# Patient Record
Sex: MALE | Race: WHITE | Employment: FULL TIME | URBAN - METROPOLITAN AREA
[De-identification: names, ages, dates, MRNs, and addresses within clinical notes are randomized per-mention and may not be internally consistent; named-entity substitution may affect disease eponyms.]

---

## 2020-03-31 ENCOUNTER — TELEPHONE (OUTPATIENT)
Dept: BEHAVIORAL HEALTH | Facility: CLINIC | Age: 40
End: 2020-03-31

## 2020-03-31 ENCOUNTER — HOSPITAL ENCOUNTER (EMERGENCY)
Facility: CLINIC | Age: 40
Discharge: ANOTHER HOSPITAL | End: 2020-03-31
Attending: EMERGENCY MEDICINE | Admitting: EMERGENCY MEDICINE

## 2020-03-31 VITALS
WEIGHT: 173.5 LBS | HEART RATE: 68 BPM | OXYGEN SATURATION: 99 % | HEIGHT: 70 IN | TEMPERATURE: 98.2 F | RESPIRATION RATE: 16 BRPM | DIASTOLIC BLOOD PRESSURE: 74 MMHG | SYSTOLIC BLOOD PRESSURE: 101 MMHG | BODY MASS INDEX: 24.84 KG/M2

## 2020-03-31 DIAGNOSIS — R45.851 SUICIDAL IDEATION: ICD-10-CM

## 2020-03-31 PROBLEM — Z86.59 HX OF MAJOR DEPRESSION: Status: ACTIVE | Noted: 2017-08-21

## 2020-03-31 LAB
AMPHETAMINES UR QL SCN: NEGATIVE
ANION GAP SERPL CALCULATED.3IONS-SCNC: 4 MMOL/L (ref 3–14)
BARBITURATES UR QL: NEGATIVE
BASOPHILS # BLD AUTO: 0.1 10E9/L (ref 0–0.2)
BASOPHILS NFR BLD AUTO: 0.7 %
BENZODIAZ UR QL: NEGATIVE
BUN SERPL-MCNC: 16 MG/DL (ref 7–30)
CALCIUM SERPL-MCNC: 9.2 MG/DL (ref 8.5–10.1)
CANNABINOIDS UR QL SCN: POSITIVE
CHLORIDE SERPL-SCNC: 108 MMOL/L (ref 94–109)
CO2 SERPL-SCNC: 27 MMOL/L (ref 20–32)
COCAINE UR QL: NEGATIVE
CREAT SERPL-MCNC: 0.85 MG/DL (ref 0.66–1.25)
DIFFERENTIAL METHOD BLD: NORMAL
EOSINOPHIL # BLD AUTO: 0.2 10E9/L (ref 0–0.7)
EOSINOPHIL NFR BLD AUTO: 3.2 %
ERYTHROCYTE [DISTWIDTH] IN BLOOD BY AUTOMATED COUNT: 13 % (ref 10–15)
ETHANOL SERPL-MCNC: <0.01 G/DL
ETHANOL UR QL SCN: NEGATIVE
GFR SERPL CREATININE-BSD FRML MDRD: >90 ML/MIN/{1.73_M2}
GLUCOSE SERPL-MCNC: 84 MG/DL (ref 70–99)
HCT VFR BLD AUTO: 47.9 % (ref 40–53)
HGB BLD-MCNC: 15.6 G/DL (ref 13.3–17.7)
IMM GRANULOCYTES # BLD: 0 10E9/L (ref 0–0.4)
IMM GRANULOCYTES NFR BLD: 0.3 %
LYMPHOCYTES # BLD AUTO: 2 10E9/L (ref 0.8–5.3)
LYMPHOCYTES NFR BLD AUTO: 29.1 %
MCH RBC QN AUTO: 30.6 PG (ref 26.5–33)
MCHC RBC AUTO-ENTMCNC: 32.6 G/DL (ref 31.5–36.5)
MCV RBC AUTO: 94 FL (ref 78–100)
MONOCYTES # BLD AUTO: 0.7 10E9/L (ref 0–1.3)
MONOCYTES NFR BLD AUTO: 9.5 %
NEUTROPHILS # BLD AUTO: 3.9 10E9/L (ref 1.6–8.3)
NEUTROPHILS NFR BLD AUTO: 57.2 %
NRBC # BLD AUTO: 0 10*3/UL
NRBC BLD AUTO-RTO: 0 /100
OPIATES UR QL SCN: NEGATIVE
PLATELET # BLD AUTO: 252 10E9/L (ref 150–450)
POTASSIUM SERPL-SCNC: 3.8 MMOL/L (ref 3.4–5.3)
RBC # BLD AUTO: 5.1 10E12/L (ref 4.4–5.9)
SODIUM SERPL-SCNC: 139 MMOL/L (ref 133–144)
WBC # BLD AUTO: 6.9 10E9/L (ref 4–11)

## 2020-03-31 PROCEDURE — 80048 BASIC METABOLIC PNL TOTAL CA: CPT | Performed by: EMERGENCY MEDICINE

## 2020-03-31 PROCEDURE — 90791 PSYCH DIAGNOSTIC EVALUATION: CPT

## 2020-03-31 PROCEDURE — 99285 EMERGENCY DEPT VISIT HI MDM: CPT | Mod: Z6 | Performed by: EMERGENCY MEDICINE

## 2020-03-31 PROCEDURE — 80320 DRUG SCREEN QUANTALCOHOLS: CPT | Performed by: EMERGENCY MEDICINE

## 2020-03-31 PROCEDURE — 99285 EMERGENCY DEPT VISIT HI MDM: CPT | Mod: 25 | Performed by: EMERGENCY MEDICINE

## 2020-03-31 PROCEDURE — 85025 COMPLETE CBC W/AUTO DIFF WBC: CPT | Performed by: EMERGENCY MEDICINE

## 2020-03-31 PROCEDURE — 80307 DRUG TEST PRSMV CHEM ANLYZR: CPT | Performed by: EMERGENCY MEDICINE

## 2020-03-31 RX ORDER — DEXTROAMPHETAMINE SACCHARATE, AMPHETAMINE ASPARTATE, DEXTROAMPHETAMINE SULFATE AND AMPHETAMINE SULFATE 1.25; 1.25; 1.25; 1.25 MG/1; MG/1; MG/1; MG/1
5 TABLET ORAL 2 TIMES DAILY
Status: ON HOLD | COMMUNITY
End: 2020-04-01

## 2020-03-31 RX ORDER — SERTRALINE HYDROCHLORIDE 100 MG/1
150 TABLET, FILM COATED ORAL DAILY
Status: ON HOLD | COMMUNITY
End: 2020-04-09

## 2020-03-31 RX ORDER — CLONAZEPAM 0.5 MG/1
0.5 TABLET ORAL 2 TIMES DAILY PRN
Status: ON HOLD | COMMUNITY
End: 2020-04-09

## 2020-03-31 ASSESSMENT — ENCOUNTER SYMPTOMS
NERVOUS/ANXIOUS: 1
EYE REDNESS: 0
ABDOMINAL PAIN: 0
COLOR CHANGE: 0
DYSPHORIC MOOD: 1
NECK STIFFNESS: 0
CONFUSION: 0
SHORTNESS OF BREATH: 0
DIFFICULTY URINATING: 0
ARTHRALGIAS: 0
FEVER: 0
HEADACHES: 0
CHILLS: 0
COUGH: 0

## 2020-03-31 ASSESSMENT — MIFFLIN-ST. JEOR: SCORE: 1713.24

## 2020-03-31 NOTE — ED NOTES
I have performed an in person assessment of the patient. Based on this assessment the patient no longer requires a one on one attendant at this point in time.    Nancy Cotton DO  4:27 PM  March 31, 2020         Nancy Cotton DO  03/31/20 [id number]

## 2020-03-31 NOTE — ED TRIAGE NOTES
"39 year old male presents to ED with concerns of possible self harm.  Patient states that he has been working about 70 hours a week, feels little support at home, and has two dependent children confined to the home due to pandemic.  Patient has thoughts of harming himself, but no concrete plan, and no means to act upon any plan.  Patient has been seeing a therapist, but over the past few days his \"thoughts have been racing\" and he is becoming scared.   "
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
## 2020-03-31 NOTE — TELEPHONE ENCOUNTER
Patient cleared and ready for behavioral bed placement: Yes    S Pt is a 39 year old male at the [facility] ed    B Pt is SI with a plan to jump off a bridge. Pt has been staying in a hotel since Sunday. Pt left house with girlfriend and kids after an argument. Pt has been suicidal since Sunday. Pt has anxiety. Pt did speak with his psychiatrist via [place] who directed pt to ed. Pt denies psychosis and HI. Pt has some delusional thinking such as paranoia around his girlfriend doing things are not true but he does realize this. Pt denies drug use other then marijuana today or alcohol last use a week ago. Pt denies legal issues. Pt is medically cleared.     ERIKA Sequeira/5S    - 534pm intake spoke with ed to see if pt is willing to go to [place]  -543PM pt is willing to go to [place]  -545 called provider left vm to call back  -unit and ed aware of admission ed to call asap for report

## 2020-04-01 ENCOUNTER — HOSPITAL ENCOUNTER (INPATIENT)
Facility: HOSPITAL | Age: 40
LOS: 9 days | Discharge: HOME | End: 2020-04-10
Attending: PSYCHIATRY & NEUROLOGY | Admitting: PSYCHIATRY & NEUROLOGY
Payer: COMMERCIAL

## 2020-04-01 DIAGNOSIS — E55.9 VITAMIN D DEFICIENCY: Primary | ICD-10-CM

## 2020-04-01 PROBLEM — R45.851 DEPRESSION WITH SUICIDAL IDEATION: Status: ACTIVE | Noted: 2020-04-01

## 2020-04-01 PROBLEM — F17.200 SMOKER: Status: ACTIVE | Noted: 2018-07-13

## 2020-04-01 PROBLEM — F32.A DEPRESSION WITH SUICIDAL IDEATION: Status: ACTIVE | Noted: 2020-04-01

## 2020-04-01 PROBLEM — L21.9 SEBORRHEIC DERMATITIS: Status: ACTIVE | Noted: 2018-07-24

## 2020-04-01 PROBLEM — F33.2 SEVERE EPISODE OF RECURRENT MAJOR DEPRESSIVE DISORDER, WITHOUT PSYCHOTIC FEATURES (H): Status: ACTIVE | Noted: 2017-08-21

## 2020-04-01 PROBLEM — Z82.49 FAMILY HISTORY OF HEART DISEASE IN MALE FAMILY MEMBER BEFORE AGE 55: Status: ACTIVE | Noted: 2017-08-21

## 2020-04-01 PROCEDURE — 99223 1ST HOSP IP/OBS HIGH 75: CPT | Performed by: NURSE PRACTITIONER

## 2020-04-01 PROCEDURE — 12400000 ZZH R&B MH

## 2020-04-01 RX ORDER — BENZOCAINE/MENTHOL 6 MG-10 MG
LOZENGE MUCOUS MEMBRANE 2 TIMES DAILY PRN
Status: DISCONTINUED | OUTPATIENT
Start: 2020-04-01 | End: 2020-04-10 | Stop reason: HOSPADM

## 2020-04-01 RX ORDER — VENLAFAXINE HYDROCHLORIDE 37.5 MG/1
37.5 CAPSULE, EXTENDED RELEASE ORAL
Status: COMPLETED | OUTPATIENT
Start: 2020-04-02 | End: 2020-04-03

## 2020-04-01 RX ORDER — ACETAMINOPHEN 325 MG/1
650 TABLET ORAL EVERY 4 HOURS PRN
Status: DISCONTINUED | OUTPATIENT
Start: 2020-04-01 | End: 2020-04-10 | Stop reason: HOSPADM

## 2020-04-01 RX ORDER — VENLAFAXINE HYDROCHLORIDE 75 MG/1
75 CAPSULE, EXTENDED RELEASE ORAL
Status: COMPLETED | OUTPATIENT
Start: 2020-04-04 | End: 2020-04-05

## 2020-04-01 RX ORDER — HYDROXYZINE HYDROCHLORIDE 25 MG/1
25-50 TABLET, FILM COATED ORAL EVERY 4 HOURS PRN
Status: DISCONTINUED | OUTPATIENT
Start: 2020-04-01 | End: 2020-04-10 | Stop reason: HOSPADM

## 2020-04-01 RX ORDER — ALUMINA, MAGNESIA, AND SIMETHICONE 2400; 2400; 240 MG/30ML; MG/30ML; MG/30ML
30 SUSPENSION ORAL EVERY 4 HOURS PRN
Status: DISCONTINUED | OUTPATIENT
Start: 2020-04-01 | End: 2020-04-10 | Stop reason: HOSPADM

## 2020-04-01 RX ORDER — TRAZODONE HYDROCHLORIDE 50 MG/1
50 TABLET, FILM COATED ORAL
Status: DISCONTINUED | OUTPATIENT
Start: 2020-04-01 | End: 2020-04-05 | Stop reason: ALTCHOICE

## 2020-04-01 RX ORDER — VENLAFAXINE HYDROCHLORIDE 150 MG/1
150 CAPSULE, EXTENDED RELEASE ORAL
Status: DISCONTINUED | OUTPATIENT
Start: 2020-04-06 | End: 2020-04-05

## 2020-04-01 ASSESSMENT — ACTIVITIES OF DAILY LIVING (ADL)
DRESS: SCRUBS (BEHAVIORAL HEALTH);INDEPENDENT
HYGIENE/GROOMING: INDEPENDENT
SWALLOWING: 0-->SWALLOWS FOODS/LIQUIDS WITHOUT DIFFICULTY
ORAL_HYGIENE: INDEPENDENT
AMBULATION: 0-->INDEPENDENT
RETIRED_EATING: 0-->INDEPENDENT
BATHING: 0-->INDEPENDENT
PRIOR_FUNCTIONAL_LEVEL_COMMENT: .
TOILETING: 0-->INDEPENDENT
TRANSFERRING: 0-->INDEPENDENT
FALL_HISTORY_WITHIN_LAST_SIX_MONTHS: NO
DRESS: INDEPENDENT
HYGIENE/GROOMING: INDEPENDENT
ORAL_HYGIENE: INDEPENDENT
DRESS: 0-->INDEPENDENT
ORAL_HYGIENE: INDEPENDENT
HYGIENE/GROOMING: INDEPENDENT
DRESS: SCRUBS (BEHAVIORAL HEALTH)
COGNITION: 0 - NO COGNITION ISSUES REPORTED
RETIRED_COMMUNICATION: 0-->UNDERSTANDS/COMMUNICATES WITHOUT DIFFICULTY
LAUNDRY: UNABLE TO COMPLETE

## 2020-04-01 NOTE — PROGRESS NOTES
04/01/20 0238   Valuables   Patient Belongings locker;sent to security per site process   Patient Belongings Put in Hospital Secure Location (Security or Locker, etc.) cash/credit card;cell phone/electronics;clothing;keys;plastic bag;purse/wallet;shoes;wallet  (6 camel cigs, white lighter chapstick, misc. rewards cards, hotel card, and papers, library card, brn wallet, brn boots, phone #'s, beige pants, blue boxers, wht tshirt, copper colored vest, black socks, anna/grn flannel shirt)   Did you bring any home meds/supplements to the hospital?  No   List items sent to safe: State of [place] check in the amount of $112.31, set of keys, white apple cell phone, black phone case, 2 white us debit bank cards, 1 blue us bank card, [employer] visa card, [employer] one mastercard, [employer] gift, [place] [employer] employee badge    All other belongings put in assigned cubby in belongings room.     I have reviewed my belongings list on admission and verify that it is correct.     Patient signature_______________________________    Second staff witness (if patient unable to sign) ______________________________       I have received all my belongings at discharge.    Patient signature________________________________    BASILIA Clarke  4/1/2020  2:53 AM
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
## 2020-04-01 NOTE — PLAN OF CARE
"  Problem: Depression  Goal: Improved Mood  Outcome: No Change     Problem: Adult Behavioral Health Plan of Care  Goal: Patient-Specific Goal (Individualization)  Outcome: No Change  Note:   ADMISSION NOTE    Reason for admission Depression and suicidal thoughts. My Psychiatrist asked me to go in to ER.  Safety concerns Suicidal thoughts.  Risk for or history of violence none.   Full skin assessment: done    Patient arrived on unit from [facility] ED accompanied by Ambulance and security on 4/1/2020  3:31 AM.   Status on arrival: via stretcher  BP 99/62   Pulse 77   Temp 96.8  F (36  C) (Tympanic)   Resp 16   SpO2 98%   Patient given tour of unit and Welcome to  unit papers given to patient, wanding completed, belongings inventoried, and admission assessment completed.   Patient's legal status on arrival is voluntary. Appropriate legal rights discussed with and copy given to patient. Patient Bill of Rights discussed with and copy given to patient.   Patient denies SI, HI, and thoughts of self harm and contracts for safety while on unit.      Patient admitted from the [facility] ED. Patient states he has had increasing depression with thoughts of suicide yesterday to jump off a bridge. Patient has been taking zoloft and in the past two days his psychiatrist also gave him klonopin 0.5 mg po bid and that helped with his anxiety. Patient states he has slept poorly related to his job, his children being home more now and the fears related to the corona virus. Patient states he feels now that things can get better and has been able to talk about his feelings. Does not feel that he has a good support system at home with his girlfriend. States he has been working 70 plus hours per week.  Denies physical problems including pain.   Hansa Barriga, RN  4/1/2020  0141 AM           Patient's Stated Goal for Shift:  \"no stated goal\"    Goal Status:  In Process       Problem: Suicide " Risk  Goal: Absence of Self-Harm  Outcome: Improving

## 2020-04-01 NOTE — H&P
"[facility]    Psychiatric Evaluation/History & Physical    Patient Name: Jason Ring   YOB: 1980  Age: 39 year old  [id number]    Primary Physician: No Ref-Primary, Physician   Completed By: RAFAEL Arceo CNP     CC: \"stressed\"         HPI:     Jason Ring is a 39 year old 39 year old male with a medical history significant for suicidal ideation and alcohol abuse who presents to the Emergency Department for suicidal ideation.  Stressors include increased workload at [employer] of 70 hours six days a wee, three weeks of worsening feelings of hopelessness and thoughts of suicide.  Lives with girlfriend of 10 years and caring for two small children at home.  He states he has only been sleeping about three hours a night.  He stayed in a hotel last night to try and \"escape\" his thoughts but continues to have thoughts of jumping off a bridge. Currently taking Zoloft and clonazepam.  Reports one previous hospitalization when drunk and suicidal in [address]014.  Toxicology was positive for cannabis. Patient was transferred to [facility] and voluntarily admitted to Inpatient Behavioral Health for further assessment and stabilization.    During initial interview with  present, patient stated he has struggled with depression since his 20's and tried fluoxetine and Zoloft, which he feels is not working.  Reviewed antidepressants and agreeable to start SNRI Effexor titrated up to 150 mg.  Educated regarding medication indications, risks, benefits, side effects, contraindications and possible interactions. Verbally expressed understanding.Had been seeing a therapist but did not care for the lack of direction \"nice mp, just listens\". Reviewed other therapies such as CBT which patient may find beneficial.  Stated lack of sleep, increased workload and lack of resources \"like a [place] rush but not staffed for this in March\" with [employer]. Had been sleeping in two periods of 3-4 hours " "each but now only sleeping about three hours. Stated increased anxiety. Was having thoughts of suicide with plan but denied today. Stated decreased appetite with 10 pound weight loss, clothes are reported to be looser.  Denied paranoid thoughts or auditory and visual hallucinations.  Stated this is \"not like the other hospitalization\" which he noted he was drunk and suicidal.  Stated he does use mariajuana once in a while but not finding the results therapeutic at this time.     Patient provides information for this assessment. Intake data, records from previous hospitalizations and records from the Emergency Department were reviewed.          PMSPFH:     Past Medical History:  Past Medical History:   Diagnosis Date     Closed left ankle fracture      Depressive disorder      Past Surgical History:  Past Surgical History:   Procedure Laterality Date     ORTHOPEDIC SURGERY       Past Psychiatric History:  Previous psychiatric diagnoses include: Depressive Disorder, NOS and Alcohol Use Disorder. He has had one previous psychiatric hospitalization in 2014. He admits to no previous suicide attempts and denies engaging in self-injurious behavior. Patient is currently seeing Brandon Johnson for therapy and Juani Goode for psychiatry, both work individually.  Previous psychotropic medication trials include: Prozac (on for \"years\") and Zoloft (no benefit).  Substance Use History:  He states that he uses alcohol \"about a drink a day\" and does not have a history of alcohol withdrawal or history of seizures with withdrawal. He admits to using cannabis recreationally, but denied using other substance of abuse.  Patient denies previous substance use disorder treatment.   Social History:  Patient lives with his girlfriend of 10 years and a young son and daughter.  He did obtain his GED and has not gone on to other schooling. Stated he lives in a house he owns and denied financial difficulties.  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  Stated increased workflow since COVID19 crisis.  He is not a member of the . The patient denies current legal issues including probation.   Family History:   No family history on file.  Home Medications:   Medications Prior to Admission   Medication Sig Dispense Refill Last Dose     clonazePAM (KLONOPIN) 0.5 MG tablet Take 0.5 mg by mouth 2 times daily as needed for anxiety   3/31/2020 at Unknown time     sertraline (ZOLOFT) 100 MG tablet Take 150 mg by mouth daily   3/31/2020 at Unknown time     Medical History and ROS:  No current outpatient medications on file.     No Known Allergies         Physical Exam:    /66   Pulse 70   Temp 97.7  F (36.5  C) (Tympanic)   Resp 16   SpO2 97%   Completed on 3/31/20 by Dr. STEPH Cotton:  \"Vitals signs and nursing note reviewed.   Constitutional:       General: He is not in acute distress.     Appearance: Normal appearance. He is not diaphoretic.   HENT:      Head: Atraumatic.   Eyes:      General: No scleral icterus.     Pupils: Pupils are equal, round, and reactive to light.   Neck:      Musculoskeletal: Neck supple.   Cardiovascular:      Rate and Rhythm: Normal rate and regular rhythm.      Pulses: Normal pulses.      Heart sounds: Normal heart sounds.   Pulmonary:      Effort: Pulmonary effort is normal. No respiratory distress.      Breath sounds: Normal breath sounds. No wheezing or rales.   Abdominal:      General: Bowel sounds are normal.      Palpations: Abdomen is soft.      Tenderness: There is no abdominal tenderness.   Musculoskeletal:         General: No tenderness.   Skin:     General: Skin is warm.      Capillary Refill: Capillary refill takes less than 2 seconds.      Findings: No rash.   Neurological:      General: No focal deficit present.      Mental Status: He is alert and oriented to person, place, and time.   Psychiatric:         Mood and Affect: Affect normal. Mood is anxious and depressed.         Speech: " "Speech normal.         Behavior: Behavior normal.         Thought Content: Thought content includes suicidal ideation. Thought content does not include homicidal ideation. Thought content includes suicidal plan. Thought content does not include homicidal plan.\":    Essentially unchanged at interview today.         Review of Systems:     Constitution: No weight loss, fever, night sweats  Respiratory: No cough or dyspnea  Cardiovascular:  No chest pain,  palpitations or fainting  Gastrointestinal:  No abdominal pain, nausea, vomiting or change in bowel habits  Genitourinary: Negative  Skin: No rashes, pruritus or open wounds  Neurological: No headaches or seizure activity.  Musculoskeletal: No muscle pain, joint pain or swelling   Psychiatric/Behavioral:  See HPI            Psychiatric Examination:     Appearance:  awake, alert, adequately groomed and dressed in hospital scrubs  Attitude:  cooperative  Eye Contact:  fair  Mood:  depressed  Affect:  mood congruent  Speech:  clear, coherent  Psychomotor Behavior:  no evidence of tardive dyskinesia, dystonia, or tics  Thought Process:  logical, linear and goal oriented  Associations:  no loose associations  Thought Content:  no evidence of suicidal ideation or homicidal ideation and no evidence of psychotic thought  Insight:  good  Judgment:  intact  Oriented to:  time, person, and place  Attention Span and Concentration:  intact  Recent and Remote Memory:  intact  Fund of Knowledge: appropriate  Muscle Strength and Tone: normal  Gait and Station: Normal          Labs:   No results found for any visits on 04/01/20.     Assessment/Impression: Patient Jason Ring is a 39 year old male admitted on 4/1/2020 with suicidal ideation with plan to jump off a bridge. He has an increased number of stressors with work load and decreased sleep along with increased depression. He has not seen his therapist for several months, but did not feel he was directive or effective.  Patient " reports seeing psychiatrist and paying out of pocket but not feeling it was particularly helpful.  Patient agreeable to have new PCP who can manage medications.  He is agreeable to try SNRI rather than a third serotonin specific reuptake inhibitor to address depression and anxiety. Educated regarding medication indications, risks, benefits, side effects, contraindications and possible interactions. Verbally expressed understanding.  Plan to titrate Effexor XR dose to therapeutic range, set up therapy and PCP appointments as part of discharge plan. Encouraged group participation.        DSM-V Diagnoses:   Major Depressive Disorder, Recurrent, moderate  Anxiety Disorder, unspecified     Plan:  Admit to Unit: [address] [place] [address]  Attending: RAFAEL Arceo CNP  Patient is: Voluntary  Other routine labs were reviewed and notable for + THC,   Monitor for target symptoms: decreased suicidal ideation, improved sleep duration  Provide a safe environment and therapeutic milieu.   Re-Start/Start:Discontinue Zoloft (has not taken in 2 days), start Effexor XR and titrate up to 150 mg.     ELOS: 2-3 days for decreased suicidal ideation, start of medications and safe discharge plan.    RAFAEL Arceo CNP

## 2020-04-01 NOTE — PLAN OF CARE
Face to face end of shift report communicated from Hansa LI RN.     Moira Contreras RN  4/1/2020  10:18 AM       Problem: Depression  Goal: Improved Mood  Description: Pt will report decrease in depression by target date.   Pt will attend 50% of groups.     Pt states his depression is at the highest it has ever been, rating it a 10/10.   4/1/2020 1017 by Moira Contreras RN  Outcome: Improving     Problem: Suicide Risk  Goal: Absence of Self-Harm  Description: Pt will be free from self injury while on the unit.     Pt denies suicidal ideation at this time.   4/1/2020 1017 by Moira Contreras RN  Outcome: Improving     Problem: Adult Behavioral Health Plan of Care  Goal: Patient-Specific Goal (Individualization)  Description: Pt will sleep 6-8 hours per night.   Pt will eat 50% of meals and snacks.   Pt will attend 50% of groups.     Pt has been up in the lounge, attending groups and socializing with peers most of the morning. Pt reports that his depression is a 10/10, his anxiety it a 7/10 and that it is always at that level. Pt denies SI/HI and hallucinations. Pt denies pain. Pt's goal today is to meet the people here and make a plan for treatment.   4/1/2020 1017 by Moira Contreras RN  Outcome: Improving  Note:     Face to face end of shift report communicated to oncoming RN. Reported that pt is a suicide risk.     Moira Contreras RN  4/1/2020  10:19 AM

## 2020-04-02 PROBLEM — F33.1 MODERATE EPISODE OF RECURRENT MAJOR DEPRESSIVE DISORDER (H): Status: ACTIVE | Noted: 2017-08-21

## 2020-04-02 PROCEDURE — 99232 SBSQ HOSP IP/OBS MODERATE 35: CPT | Mod: 95 | Performed by: NURSE PRACTITIONER

## 2020-04-02 PROCEDURE — 99231 SBSQ HOSP IP/OBS SF/LOW 25: CPT | Performed by: NURSE PRACTITIONER

## 2020-04-02 PROCEDURE — 25000132 ZZH RX MED GY IP 250 OP 250 PS 637: Performed by: NURSE PRACTITIONER

## 2020-04-02 PROCEDURE — 12400000 ZZH R&B MH

## 2020-04-02 RX ADMIN — VENLAFAXINE HYDROCHLORIDE 37.5 MG: 37.5 CAPSULE, EXTENDED RELEASE ORAL at 08:34

## 2020-04-02 RX ADMIN — HYDROCORTISONE: 1 CREAM TOPICAL at 20:58

## 2020-04-02 ASSESSMENT — ACTIVITIES OF DAILY LIVING (ADL)
HYGIENE/GROOMING: INDEPENDENT
DRESS: SCRUBS (BEHAVIORAL HEALTH)
ORAL_HYGIENE: INDEPENDENT
ORAL_HYGIENE: INDEPENDENT
DRESS: INDEPENDENT
HYGIENE/GROOMING: INDEPENDENT
LAUNDRY: WITH SUPERVISION

## 2020-04-02 NOTE — PLAN OF CARE
Observed pt lying on his left side - eyes are closed - non-labored breathing noted. Did note that feet were restless - Face to face end of shift report communicated to oncoming RN  It is now 0630 and pt has slept all noc without issue.Face to face end of shift report communicated to oncoming RN..     Nina Valencia RN  4/2/2020  6:30 AM           Nina Valencia RN  4/2/202

## 2020-04-02 NOTE — PLAN OF CARE
"Face to face shift report received from Nina DYKES RN. Rounding completed, pt observed.  Malissa Kapoor RN  4/2/2020  7:50 AM  Problem: Adult Behavioral Health Plan of Care  Goal: Patient-Specific Goal (Individualization)  Description: Pt will sleep 6-8 hours per night.   Pt will eat 50% of meals and snacks.   Pt will attend 50% of groups.   Outcome: Improving  Note: Shift Summery:  Patient has been up on the unit this shift.  He is social with peers and cooperative with staff.  Patient stated he slept well and says better than \"in awhile\".  Patient denies issues with appetite.  Started on Effexor today.  Teaching sheet given to patient.  Patient is attending and participating in groups.    Problem: Suicide Risk  Goal: Absence of Self-Harm  Description: Pt will be free from self injury while on the unit.   4/2/2020 0748 by Malissa Kapoor, RN  Outcome: Improving   Denies current suicidal ideation or intent here but states that his stressors have not changed so he does not know if he would be safe at home.    Problem: Depression  Goal: Improved Mood  Description: Pt will report decrease in depression by target date.   Pt will attend 50% of groups.   4/2/2020 0748 by Malissa Kapoor RN  Outcome: Improving     Face to face report will be communicated to oncoming RN.        "

## 2020-04-02 NOTE — PLAN OF CARE
"  Problem: Depression  Goal: Improved Mood  Description: Pt will report decrease in depression by target date.   Pt will attend 50% of groups.   4/2/2020 1843 by Hansa Barriga RN  Outcome: Improving     Problem: Suicide Risk  Goal: Absence of Self-Harm  Description: Pt will be free from self injury while on the unit.   4/2/2020 1843 by Hansa Barriga RN  Outcome: Improving     Problem: Adult Behavioral Health Plan of Care  Goal: Patient-Specific Goal (Individualization)  Description: Pt will sleep 6-8 hours per night.   Pt will eat 50% of meals and snacks.   Pt will attend 50% of groups.   4/2/2020 1843 by Hansa Barriga, RN  Outcome: Improving  Note: Shift Summery:    Patient is up on the unit and remains social with peers. Engages and initiates conversations with others. Patient states that today was an excellent day. He states that has no current pain, patient ate well for supper meal, is steady on his feet. No current complaints. Depression has improved but patient unsure how he would handle situation at home if he went back at this point.    2030 Patient requested and given Hydrocortisone cream for facial redness.  Patient's Stated Goal for Shift:  \"no stated goal\"    Goal Status:  In Process       "

## 2020-04-02 NOTE — PROGRESS NOTES
"Jason Ring is a 39 year old male who is being evaluated via a billable video visit.      The patient has been notified of following:     \"This video visit will be conducted via a call between you and your physician/provider. We have found that certain health care needs can be provided without the need for an in-person physical exam.  This service lets us provide the care you need with a video conversation.  If a prescription is necessary we can send it directly to your pharmacy.  If lab work is needed we can place an order for that and you can then stop by our lab to have the test done at a later time.    If during the course of the call the physician/provider feels a video visit is not appropriate, you will not be charged for this service.\"     Patient has given verbal consent for Video visit? Yes    Patient would like the video invitation sent by: made by RN     Video Start Time: 1230    Jason Ring complains of  No chief complaint on file.      I have reviewed and updated the patient's Past Medical History, Social History, Family History and Medication List.      ALLERGIES  Patient has no known allergies.    Additional provider notes:   [facility]    Medical Services Progress Note    Date of Service (when I saw the patient): 04/02/2020    Assessment & Plan     Principal Problem:    Moderate episode of recurrent major depressive disorder (H)    Active Medical Problems:    Depression with suicidal ideation    Seborrheic dermatitis- pt reports he was seen by dermatology in the past and was prescribed hydrocortisone cream for his seborrheic dermatitis. He reports he only has to use it a couple times a week. He would like to be able to use this during his admission. Pt denies any acute complaints during visit.     Pt medically stable, no acute medical concerns. Chronic medical problems stable. Will sign off. Please consult for any new medical issues or concerns.       Code Status: No " Order.    Damaris Dow CNP    Interval History   Pt sitting in chair during video visit. NAD, makes full sentences. Pt denies any acute complaints.     -Data reviewed today: I reviewed all new labs and imaging results over the last 24 hours.     Physical Exam   Temp: 97.7  F (36.5  C) Temp src: Tympanic BP: 126/70 Pulse: 63   Resp: 14 SpO2: 97 % O2 Device: None (Room air)    There were no vitals filed for this visit.  Vital Signs with Ranges  Temp:  [97.7  F (36.5  C)] 97.7  F (36.5  C)  Pulse:  [63] 63  Resp:  [14] 14  BP: (126)/(70) 126/70  SpO2:  [97 %] 97 %  No intake/output data recorded.    Constitutional: Vital signs are stable and WNL, well appearing, well groomed.  Respiratory:  Good respiratory effort, symmetric rise and fall of chest, speaking in full sentences, no pursed lip breathing   Cardiovascular: no presence of edema, temperature of extremities measured by pt is warm.   GI: no tenderness by palpation by pt  Skin/Integumen: red patches around nose and cheeks.      Medications     venlafaxine  37.5 mg Oral Daily with breakfast    Followed by     [START ON 4/4/2020] venlafaxine  75 mg Oral Daily with breakfast    Followed by     [START ON 4/6/2020] venlafaxine  150 mg Oral Daily with breakfast       Data   Recent Labs   Lab 03/31/20  1634   WBC 6.9   HGB 15.6   MCV 94         POTASSIUM 3.8   CHLORIDE 108   CO2 27   BUN 16   CR 0.85   ANIONGAP 4   KIZZY 9.2   GLC 84       No results found for this or any previous visit (from the past 24 hour(s)).        Video-Visit Details    Type of service:  Video Visit    Video End Time (time video stopped): 1245    Originating Location (pt. Location): Other 5th floor south    Distant Location (provider location):  [facility]     Mode of Communication:  Video Conference via [employer]      Damairs Dow CNP

## 2020-04-02 NOTE — PLAN OF CARE
"  Problem: Adult Behavioral Health Plan of Care  Goal: Plan of Care Review  4/1/2020 2155 by Josee Samuel, RN  Outcome: No Change     VSS, denies pain. Denies SI, SIB, HI or hallucinations-states he feels safe here. Endorses depression and anxiety.  States he has always suffered from depression. Pt states he just became overwhelmed with his life, work , relationship and children. States he has never felt \"the excitement\" other parents seem to feel for their children. States has always had low self esteem and looks to his  significant other for all his validation. Also states she called him a lunatic and other derogatory comments that he keeps going over in his head. States \"she doesn't get it, she almost killed me. I do not want her involved in my recovery. I don't even know if the relationship is worth it.   Pt also states he change to third shift at work in October and has not had regular sleep since. States he just stayed in his bed as it was his safe place and quit doing anything around the house.   Cooperative with nursing assessment and behavior appropriate with staff and peers.   Handout rec'd on Effexor.     Problem: Depression  Goal: Improved Mood  Description: Pt will report decrease in depression by target date.   Pt will attend 50% of groups.   4/1/2020 2155 by Josee Samuel, RN  Outcome: No Change     Problem: Suicide Risk  Goal: Absence of Self-Harm  Description: Pt will be free from self injury while on the unit.   4/1/2020 2155 by Josee Samuel, RN  Outcome: Improving    Face to face end of shift report communicated to oncoming shift.     Josee Samuel RN  4/1/2020  10:06 PM           "

## 2020-04-02 NOTE — PROGRESS NOTES
"[facility]  Psychiatric Progress Note      Impression:   Patient Jason Ring is a 39 year old male admitted on 4/1/2020 with worsening depression and suicidal ideation with plan.    Patient interviewed today via video conferencing with RN present. Patient reports sleeping \"OK\". Stated he is concerned about returning to home environment where he feels unsupported and returning to work.  Is not able to identify activities that bring him sharla.  Encouraged to explore previous activities, people or settings that brought sharla and happiness and how to incorporate that into his life.  Stated he has been attending groups and finds them helpful.  He expressed continued worries about COVID 19 as he works at [employer].  Describes increasing apathy and hopelessness prior to admission. Does state his kids are not a source of stress. Reviewed Effexor XR titration and again educated regarding medication indications, risks, benefits, side effects, contraindications and possible interactions. Verbally expressed understanding. Does not have active suicidal ideation but depression is persistent and hopelessness continues. Stated some benefits to helping peer walk in halls prior to group so peer can meet his goal of attending groups.         Diagnoses:   Major Depressive Disorder, Recurrent, moderate  Anxiety Disorder, unspecified    Attestation:  Patient has been seen and evaluated by me,  RAFAEL Arceo CNP        Interim History:   The patient's care was discussed with the treatment team and chart notes were reviewed.    BEHAVIORAL TEAM DISCUSSION    Progress: minimal  Continued Stay Criteria/Rationale: suicidal ideation with plan, continued depressive symptoms, monitor start of new medication.   Medical/Physical: rash at lips,   Precautions:   Falls precaution?: YES, gait steady  Behavioral Orders   Procedures     Code 1 - Restrict to Unit     Routine Programming     As clinically indicated     Self Injury Precaution     " Status 15     Every 15 minutes.     Plan: Continue inpatient hospitalization, continue to offer therapeutic groups, monitor medication side effects and efficacy, monitor for decrease in suicidal thoughts,  Rationale for change in precautions or plan: none    ELOS: 1-3 days for decrease in suicidal ideations, monitoring of new medication and safe discharge plan.    Participants: Jannet HALL CNP,  Social Work & Nursing          Medications:     Current Facility-Administered Medications   Medication     acetaminophen (TYLENOL) tablet 650 mg     alum & mag hydroxide-simethicone (MAALOX  ES) suspension 30 mL     hydrocortisone (CORTAID) 1 % cream     hydrOXYzine (ATARAX) tablet 25-50 mg     magnesium hydroxide (MILK OF MAGNESIA) suspension 30 mL     nicotine (NICORETTE) gum 2-4 mg     traZODone (DESYREL) tablet 50 mg     venlafaxine (EFFEXOR-XR) 24 hr capsule 37.5 mg    Followed by     [START ON 4/4/2020] venlafaxine (EFFEXOR-XR) 24 hr capsule 75 mg    Followed by     [START ON 4/6/2020] venlafaxine (EFFEXOR-XR) 24 hr capsule 150 mg      10 point ROS positive for dry skin on face.       Allergies:   No Known Allergies         Psychiatric Examination:   /70   Pulse 63   Temp 97.7  F (36.5  C) (Tympanic)   Resp 14   SpO2 97%   Weight is 0 lbs 0 oz  There is no height or weight on file to calculate BMI.    Appearance:  awake, alert, adequately groomed and dressed in hospital scrubs  Attitude:  cooperative  Eye Contact:  fair  Mood:  depressed  Affect:  mood congruent  Speech:  clear, coherent  Psychomotor Behavior:  no evidence of tardive dyskinesia, dystonia, or tics  Thought Process:  logical, linear and goal oriented  Associations:  no loose associations  Thought Content:  no evidence of suicidal ideation or homicidal ideation and no evidence of psychotic thought  Insight:  good  Judgment:  intact  Oriented to:  time, person, and place  Attention Span and Concentration:  intact  Recent and Remote Memory:   "intact  Fund of Knowledge: appropriate  Muscle Strength and Tone: normal  Gait and Station: Normal         Labs:   No results found for this or any previous visit (from the past 24 hour(s)).        Video Visit:     Jason Ring is a 39 year old male who is being evaluated via a billable video visit.      The patient has been notified of following:     \"This video visit will be conducted via a call between you and your physician/provider. We have found that certain health care needs can be provided without the need for an in-person physical exam.  This service lets us provide the care you need with a video conversation.  If a prescription is necessary we can send it directly to your pharmacy.  If lab work is needed we can place an order for that and you can then stop by our lab to have the test done at a later time.    If during the course of the call the physician/provider feels a video visit is not appropriate, you will not be charged for this service.\"     Patient has given verbal consent for Video visit? Yes    Video Start Time: 10:32    I have reviewed and updated the patient's Past Medical History, Social History, Family History and Medication List as above.    Video-Visit Details    Type of service:  Video Visit    Video End Time (time video stopped): 10:46    Originating Location (pt. Location): [facility]    Mode of Communication:  Video Conference via [employer] Meeting Lay      "

## 2020-04-02 NOTE — PLAN OF CARE
"D; The pt was seen at his request through the recreational therapist.He presented as O x 4,coherent,relevant,cooperative,,talkative and pleasant. The pt denies S/H ideations He presented no present  threat of danger to himself and/or others. He was very willing to discuss his issues. \" I don't like therapist to just listen I want to be challenged.\" The pt. described his stressors as:                             1. Low self-esteem and feelings of not being appreciated.                   2. 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                   3. Poor communications with is girlfriend.                   4. Depression and anxiety.                   5. ADHD since childhood.                   6. Control issues    The pt reports that he had gone to a hotel with a serious thought of harming himself after an argument with his girlfriend but changed his mind. He said that he had not been suicidal previously. Jason agreed to start work on several issues while still at the hospital.    A.Depressive D.O.N.O.S. Agreeable to therapy.    P. See in A.M.    "

## 2020-04-02 NOTE — PLAN OF CARE
"Spoke with pt this morning with NP and nursing- Pt says he slept well. Denies SI and states he feels safe in the hospital but says when he leaves his stressors will still be there and at this point he does not feel he has the \"tools\" to keep him alive. Pt states he met with the therapist on the unit today and he is the type of therapist he is looking for. Informed pt that staff is still looking into CBT therapists in his area. Pt says he hasn't missed a group and he has been being friendly to a peer on the unit and walking with him and encouraging him to attend group programming with him- pt says cheering other people up is what is helping him.   "

## 2020-04-03 PROCEDURE — 12400000 ZZH R&B MH

## 2020-04-03 PROCEDURE — 25000132 ZZH RX MED GY IP 250 OP 250 PS 637: Performed by: NURSE PRACTITIONER

## 2020-04-03 RX ADMIN — VENLAFAXINE HYDROCHLORIDE 37.5 MG: 37.5 CAPSULE, EXTENDED RELEASE ORAL at 08:36

## 2020-04-03 RX ADMIN — HYDROXYZINE HYDROCHLORIDE 50 MG: 25 TABLET, FILM COATED ORAL at 22:01

## 2020-04-03 RX ADMIN — TRAZODONE HYDROCHLORIDE 50 MG: 50 TABLET ORAL at 22:01

## 2020-04-03 ASSESSMENT — ACTIVITIES OF DAILY LIVING (ADL)
ORAL_HYGIENE: INDEPENDENT
HYGIENE/GROOMING: INDEPENDENT
DRESS: INDEPENDENT

## 2020-04-03 NOTE — PLAN OF CARE
Observed pt lying in a supine position - eyes closed - soft audible snoring noted.  It is now 0630 and pt slept all not without issue.Face to face end of shift report communicated to oncoming RN.     Nina Valencia RN  4/3/2020  6:31 AM

## 2020-04-03 NOTE — PLAN OF CARE
Face to face shift report received from Nina DYKES RN. Rounding completed, pt observed.  Malissa Kapoor RN  4/3/2020  8:01 AM  Problem: Adult Behavioral Health Plan of Care  Goal: Patient-Specific Goal (Individualization)  Description: Pt will sleep 6-8 hours per night.   Pt will eat 50% of meals and snacks.   Pt will attend 50% of groups.   4/3/2020 0800 by Malissa Kapoor RN  Outcome: Improving  Note: Shift Summery:  Patient has been up on the unit this shift. Patient states he slept well and denies any physical issues or unwanted side effects from starting Effexor.  Patient is attending and participating in groups.  Patient is social with peers and cooperative with staff.    Problem: Suicide Risk  Goal: Absence of Self-Harm  Description: Pt will be free from self injury while on the unit.   4/3/2020 0800 by Malissa Kapoor RN  Outcome: Improving   Patient states he is working on coping skills to better deal with the stressors he is experiencing at home and work.     Problem: Depression  Goal: Improved Mood  Description: Pt will report decrease in depression by target date.   Pt will attend 50% of groups.   4/3/2020 0800 by Malissa Kapoor RN  Outcome: Improving     Face to face report will be communicated to oncoming RN.

## 2020-04-03 NOTE — PLAN OF CARE
"D: Jason was seen as scheduled yesterday. He presented as O x 4,coherent,relevant, and pleasant. He denied present S/H ideations. He  contracted for present safety. The pt reported that he had talked with his daughter but was unsure about what he will do on his return home. \"I don't know whether I should return home or go to a hotel.\" He also expressed a plan to talk with his employer about not working at night. He has no concern about having such a conversation.l Discuss with the pt his level of anxiety and expectation regarding him self. Agreed to examine his feeling re: his relationships, and seeking safety from distress    A;Decreased distress    P:Terminate on next visit.Provide exercises.                              "

## 2020-04-03 NOTE — PLAN OF CARE
Arranged follow up apts as requested for PCP and CBT therapist at [facility].    Met with pt this afternoon- Updated him on follow up apts that have been arranged. Pt says he has been very thankful to meet with unit therapist as he has been helpful. Pt asks how he will get back home when the time comes- explained transport options- Pt asks if it would just be possible for him to rent a care, as he does this a lot of work- informed pt to call around to car rental places and see if they are even renting cars out at this time. Pt states he has some anxiety about going home because he has not spoken with his girlfriend yet- encouraged pt to make a call to his girlfriend prior to discharge and if she has any questions he can have her contact staff. Pt denies any other questions or concerns at this time.

## 2020-04-03 NOTE — PLAN OF CARE
"Face to face end of shift report received from Malissa ZULUAGA RN. Rounding completed and patient observed in the lounge. No requests at this time.     20:00 Update: Patient has been pleasant and cooperative. His affect is bright and he has been laughing and talking to peers and staff. He attended groups. He endorsed some anxiety but denied HI/SI and hallucinations. He is clean and neatly dressed. Patient denied pain.     22:00 Update: Patient reported he was \"agitated\" and \"ruminating.\" He said he just want to go to sleep. Patient requested a PRN and his options were reviewed with him. He agreed to take 50mg Atarax and 50mg Trazodone.    Face to face end of shift report communicated to oncoming RN.    Problem: Adult Behavioral Health Plan of Care  Goal: Patient-Specific Goal (Individualization)  Description: Pt will sleep 6-8 hours per night.   Pt will eat 50% of meals and snacks.   Pt will attend 50% of groups.   4/3/2020 1628 by Patricia Lobo RN  Outcome: No Change     Problem: Suicide Risk  Goal: Absence of Self-Harm  Description: Pt will be free from self injury while on the unit.   4/3/2020 1628 by Patricia Lobo, RN  Outcome: Improving     Problem: Depression  Goal: Improved Mood  Description: Pt will report decrease in depression by target date.   Pt will attend 50% of groups.   4/3/2020 1628 by Patricia Lobo, RN  Outcome: No Change     "

## 2020-04-04 PROCEDURE — 12400000 ZZH R&B MH

## 2020-04-04 PROCEDURE — 25000132 ZZH RX MED GY IP 250 OP 250 PS 637: Performed by: NURSE PRACTITIONER

## 2020-04-04 PROCEDURE — 99231 SBSQ HOSP IP/OBS SF/LOW 25: CPT | Mod: 95 | Performed by: NURSE PRACTITIONER

## 2020-04-04 RX ADMIN — VENLAFAXINE HYDROCHLORIDE 75 MG: 75 CAPSULE, EXTENDED RELEASE ORAL at 08:24

## 2020-04-04 RX ADMIN — TRAZODONE HYDROCHLORIDE 50 MG: 50 TABLET ORAL at 23:45

## 2020-04-04 RX ADMIN — HYDROXYZINE HYDROCHLORIDE 50 MG: 25 TABLET, FILM COATED ORAL at 23:45

## 2020-04-04 ASSESSMENT — ACTIVITIES OF DAILY LIVING (ADL)
HYGIENE/GROOMING: INDEPENDENT
ORAL_HYGIENE: INDEPENDENT
DRESS: INDEPENDENT

## 2020-04-04 NOTE — PROGRESS NOTES
"[facility]  Psychiatric Progress Note      Impression:   Patient Jason Ring is a 39 year old male admitted on 4/1/2020 with worsening depression and suicidal ideation with plan.    Patient interviewed today via video conferencing with RN present. Patient reports agitation and anger towards live in girlfriend of 10 years who has not called him since admission. Stated \"she never apologizes for anything, it's always me - I do everything\".  Stated he has left messages with unit phone number and daughter has called him back.  Encouraged to make Pros/Cons list about relationship as he is thinking about this past several days. Stated sleep poor last night due to these thoughts, otherwise has noted improved sleep since admission. Does report positive of talking with work supervisor and \"can take as much time as I need\".   Stated \"no outside stressors right now except her [girlfriend]\". Continues to report benefits from attending groups and finds them helpful.  Reported brief euphoria first day of Effexor XR and not since then. Patient presents as noticing minute details about himself, body, and interactions with others.  Does not endorse suicidal ideation but depression is persistent.          Diagnoses:   Major Depressive Disorder, Recurrent, moderate  Anxiety Disorder, unspecified  R/O Personality Disorder    Attestation:  Patient has been seen and evaluated by me,  RAFAEL Arceo CNP        Interim History:   The patient's care was discussed with the treatment team and chart notes were reviewed.    BEHAVIORAL TEAM DISCUSSION    Progress: minimal  Continued Stay Criteria/Rationale: suicidal ideation with plan, continued depressive symptoms, monitor medication changes.   Medical/Physical: None  Precautions:   Falls precaution?: YES, gait steady  Behavioral Orders   Procedures     Code 1 - Restrict to Unit     Routine Programming     As clinically indicated     Self Injury Precaution     Status 15     " Every 15 minutes.     Plan: Continue inpatient hospitalization, continue to offer therapeutic groups, monitor medication side effects and efficacy, monitor for decrease in suicidal thoughts, decrease in anxiety  Rationale for change in precautions or plan: none    ELOS: 1-3 days for decrease in suicidal ideations, monitoring of new medication and safe discharge plan.    Participants: Jannet HALL CNP & Nursing          Medications:     Current Facility-Administered Medications   Medication     acetaminophen (TYLENOL) tablet 650 mg     alum & mag hydroxide-simethicone (MAALOX  ES) suspension 30 mL     hydrocortisone (CORTAID) 1 % cream     hydrOXYzine (ATARAX) tablet 25-50 mg     magnesium hydroxide (MILK OF MAGNESIA) suspension 30 mL     nicotine (NICORETTE) gum 2-4 mg     traZODone (DESYREL) tablet 50 mg     venlafaxine (EFFEXOR-XR) 24 hr capsule 75 mg    Followed by     [START ON 4/6/2020] venlafaxine (EFFEXOR-XR) 24 hr capsule 150 mg      10 point ROS positive for dry skin on face.       Allergies:   No Known Allergies         Psychiatric Examination:   /74   Pulse 68   Temp 97  F (36.1  C) (Tympanic)   Resp 14   SpO2 97%   Weight is 0 lbs 0 oz  There is no height or weight on file to calculate BMI.    Appearance:  awake, alert, adequately groomed and dressed in hospital scrubs  Attitude:  cooperative  Eye Contact:  fair  Mood:  anxious and depressed  Affect:  mood congruent and intensity is heightened  Speech:  clear, coherent  Psychomotor Behavior:  no evidence of tardive dyskinesia, dystonia, or tics  Thought Process:  logical, linear, goal oriented and obsessive thoughts  Associations:  no loose associations  Thought Content:  no evidence of suicidal ideation or homicidal ideation and no evidence of psychotic thought  Insight:  good  Judgment:  intact  Oriented to:  time, person, and place  Attention Span and Concentration:  intact  Recent and Remote Memory:  intact  Fund of Knowledge:  "appropriate  Muscle Strength and Tone: normal  Gait and Station: Normal         Labs:   No results found for this or any previous visit (from the past 24 hour(s)).        Video Visit:     Jason Ring is a 39 year old male who is being evaluated via a billable video visit.      The patient has been notified of following:     \"This video visit will be conducted via a call between you and your physician/provider. We have found that certain health care needs can be provided without the need for an in-person physical exam.  This service lets us provide the care you need with a video conversation.  If a prescription is necessary we can send it directly to your pharmacy.  If lab work is needed we can place an order for that and you can then stop by our lab to have the test done at a later time.    If during the course of the call the physician/provider feels a video visit is not appropriate, you will not be charged for this service.\"     Patient has given verbal consent for Video visit? Yes    Video Start Time: 10:28    I have reviewed and updated the patient's Past Medical History, Social History, Family History and Medication List as above.    Video-Visit Details    Type of service:  Video Visit    Video End Time (time video stopped): 10:38    Originating Location (pt. Location): [facility]    Mode of Communication:  Video Conference via [employer] Meeting Lay      "

## 2020-04-04 NOTE — PLAN OF CARE
"Face to face end of shift report received from Josee ALVAREZ RN. Rounding completed and patient observed in group.    20:00 Update: Patient endorsed both anxiety and depression but stated they are much improved since admit. He denied HI/SI and hallucinations. He said he is spending a lot of time writing in his journal and \"figuring things out.\" Patient wanted to talk about how he would get home and said he is planning on renting a [employer] to get his stuff out of his girlfriends house. Patient attended groups. He talks loud and seems to like to be the center of attention. He also takes on a role of taking care of others and at times asks staff questions that he shouldn't about peers. Patient's affect is bright and he is slightly grandiosely positive. He denied pain and denied needing any other PRNs.     Face to face end of shift report communicated to oncmireya RN.       Problem: Depression  Goal: Improved Mood  Description: Pt will report decrease in depression by target date.   Pt will attend 50% of groups.   4/4/2020 1606 by Patricia Lobo RN  Outcome: Improving     Problem: Suicide Risk  Goal: Absence of Self-Harm  Description: Pt will be free from self injury while on the unit.   4/4/2020 1606 by Patricia Lobo RN  Outcome: Improving     Problem: Adult Behavioral Health Plan of Care  Goal: Patient-Specific Goal (Individualization)  Description: Pt will sleep 6-8 hours per night.   Pt will eat 50% of meals and snacks.   Pt will attend 50% of groups.   4/4/2020 1606 by Patricia Lobo, RN  Outcome: Improving     "

## 2020-04-04 NOTE — PLAN OF CARE
"  Problem: Adult Behavioral Health Plan of Care  Goal: Patient-Specific Goal (Individualization)  Description: Pt will sleep 6-8 hours per night.   Pt will eat 50% of meals and snacks.   Pt will attend 50% of groups.   Outcome: Improving  Note:     VSS, denies pain. Denies SI, SIB, HI or hallucinations. Endorses anxiety and depression. States he feels safe here. Pt approaches this writer and states \"I have to tell you something\". Pt comes close to whisper--pt begins talking about two peers. This writer tells  pt \"unless it is about safety of someone (self harm or harming others) we are not going to discuss peers or their problems.\"  pt becomes irritated takes his medication and walks away. (pt voiced this same \"concern\" already with other staff and it was passed off in report.)  Alpha-stim utilized this afternoon-pt states he benefited  from it. Pt later apologizes for his reaction this AM.   Pt asks  this writer if  he could be Bipolar-this writer explains to pt that he should talk to his provider about this-possibly write down his symptoms he feels corollate with Bipolar diagnosis.   Cooperative with nursing assessment and medications. Attends and participates in groups.      Problem: Suicide Risk  Goal: Absence of Self-Harm  Description: Pt will be free from self injury while on the unit.   Outcome: Improving     Problem: Depression  Goal: Improved Mood  Description: Pt will report decrease in depression by target date.   Pt will attend 50% of groups.   Outcome: Improving    Face to face end of shift report communicated to oncoming shift.      Josee Samuel RN  4/4/2020  9:49 AM           "

## 2020-04-04 NOTE — PLAN OF CARE
Observed pt lying on right side - eyes closed - non-labored breathing noted.   It is now 0645 and pt has slept all noc - did notice some restlessness on rounds.

## 2020-04-05 PROCEDURE — 12400000 ZZH R&B MH

## 2020-04-05 PROCEDURE — 25000132 ZZH RX MED GY IP 250 OP 250 PS 637: Performed by: NURSE PRACTITIONER

## 2020-04-05 RX ORDER — CLONIDINE HYDROCHLORIDE 0.1 MG/1
0.1 TABLET ORAL 3 TIMES DAILY PRN
Status: DISCONTINUED | OUTPATIENT
Start: 2020-04-05 | End: 2020-04-10 | Stop reason: HOSPADM

## 2020-04-05 RX ORDER — HYDROXYZINE HYDROCHLORIDE 25 MG/1
50-100 TABLET, FILM COATED ORAL
Status: DISCONTINUED | OUTPATIENT
Start: 2020-04-05 | End: 2020-04-10 | Stop reason: HOSPADM

## 2020-04-05 RX ADMIN — CLONIDINE HYDROCHLORIDE 0.1 MG: 0.1 TABLET ORAL at 19:26

## 2020-04-05 RX ADMIN — NICOTINE POLACRILEX 2 MG: 2 GUM, CHEWING ORAL at 19:31

## 2020-04-05 RX ADMIN — VENLAFAXINE HYDROCHLORIDE 75 MG: 75 CAPSULE, EXTENDED RELEASE ORAL at 08:09

## 2020-04-05 ASSESSMENT — ACTIVITIES OF DAILY LIVING (ADL)
ORAL_HYGIENE: INDEPENDENT
HYGIENE/GROOMING: INDEPENDENT
DRESS: INDEPENDENT

## 2020-04-05 NOTE — PLAN OF CARE
Administered trazadone 50 mg po and atarax 50 mg po at 2345 for insomnia and anxiety - also asked for and received apple juice.Face to face end of shift report communicated to oncoming RN..     Nina Valencia RN  4/5/2020  5:58 AM

## 2020-04-05 NOTE — PLAN OF CARE
"  Problem: Adult Behavioral Health Plan of Care  Goal: Patient-Specific Goal (Individualization)  Description: Pt will sleep 6-8 hours per night.   Pt will eat 50% of meals and snacks.   Pt will attend 50% of groups.   Outcome: Improving  Note:   VSS, denies pain. Denies SI, SIB, HI or hallucinations. States he still feels depressed and anxiety remains high--utilizes Alpha Stim this afternoon with positive results. .    Somewhat intrusive with peers-- tends to monopolize conversations. Speech is pressured, continually interrupts during conversations, does not let others finish a sentence.   States he needs Trazodone discontinued as it makes him agitated.--pt informed provider already discontinued.   Pt states he is feeling increased negativity and agitation--\"and look my hands are sweaty\".   States he has little patience with peers and staff today when he normally would not feel bothered. Pt is unsure what is causing this thinks maybe the Effexor. \"I'll know tomorrow when it's increased to 150 mg\". Talks about peers in group and how they cannot stay on topic or they just color which irritates him.   Handouts on Bipolar and borderline personality disorder given to pt. Pt first states he thinks he has borderline perspnality disorder but changes his mind at states he thinks he has Bipolar-\"that's the rat hole I need to go down.\"        Problem: Suicide Risk  Goal: Absence of Self-Harm  Description: Pt will be free from self injury while on the unit.   Outcome: Improving     Problem: Depression  Goal: Improved Mood  Description: Pt will report decrease in depression by target date.   Pt will attend 50% of groups.   Outcome: Improving     .Face to face end of shift report communicated to oncoming.     oJsee Samuel RN  4/5/2020  12:44 PM          "

## 2020-04-05 NOTE — PROGRESS NOTES
Treatment Team Progress Note:   The patient's care was discussed with the treatment team and chart notes were reviewed.    BEHAVIORAL TEAM DISCUSSION    Progress: minimal  Continued Stay Criteria/Rationale: suicidal ideation with plan, continued depressive symptoms, monitor medication changes.   Medical/Physical: None  Precautions:   Falls precaution?: YES, gait steady}  Behavioral Orders   Procedures     Code 1 - Restrict to Unit     Routine Programming     As clinically indicated     Self Injury Precaution     Status 15     Every 15 minutes.     Plan: Continue inpatient hospitalization, continue to offer therapeutic groups, monitor medication side effects and efficacy, monitor for decrease in depressive symptoms of hopelessness, suicidal thoughts, decrease in anxiety  Rationale for change in precautions or plan: none     ELOS: 2-3 days for decrease in suicidal ideations, monitoring of new medication and safe discharge plan.     Participants: Jannet HALL CNP & Nursing

## 2020-04-06 PROCEDURE — 99232 SBSQ HOSP IP/OBS MODERATE 35: CPT | Mod: 95 | Performed by: NURSE PRACTITIONER

## 2020-04-06 PROCEDURE — 12400000 ZZH R&B MH

## 2020-04-06 PROCEDURE — 25000132 ZZH RX MED GY IP 250 OP 250 PS 637: Performed by: NURSE PRACTITIONER

## 2020-04-06 RX ORDER — LITHIUM CARBONATE 300 MG/1
300 TABLET, FILM COATED, EXTENDED RELEASE ORAL EVERY 12 HOURS SCHEDULED
Status: DISCONTINUED | OUTPATIENT
Start: 2020-04-06 | End: 2020-04-09

## 2020-04-06 RX ORDER — VITAMIN B COMPLEX
50 TABLET ORAL DAILY
Status: DISCONTINUED | OUTPATIENT
Start: 2020-04-06 | End: 2020-04-10 | Stop reason: HOSPADM

## 2020-04-06 RX ADMIN — HYDROXYZINE HYDROCHLORIDE 100 MG: 25 TABLET, FILM COATED ORAL at 21:50

## 2020-04-06 RX ADMIN — MELATONIN 50 MCG: at 15:06

## 2020-04-06 RX ADMIN — LITHIUM CARBONATE 300 MG: 300 TABLET, EXTENDED RELEASE ORAL at 20:47

## 2020-04-06 RX ADMIN — HYDROXYZINE HYDROCHLORIDE 50 MG: 25 TABLET, FILM COATED ORAL at 15:06

## 2020-04-06 RX ADMIN — CLONIDINE HYDROCHLORIDE 0.1 MG: 0.1 TABLET ORAL at 13:17

## 2020-04-06 ASSESSMENT — ACTIVITIES OF DAILY LIVING (ADL)
ORAL_HYGIENE: INDEPENDENT
ORAL_HYGIENE: INDEPENDENT
HYGIENE/GROOMING: INDEPENDENT
HYGIENE/GROOMING: INDEPENDENT
DRESS: INDEPENDENT
DRESS: INDEPENDENT;SCRUBS (BEHAVIORAL HEALTH)

## 2020-04-06 NOTE — PLAN OF CARE
"Met with pt this afternoon with NP- Pt states he is still agitated and has \"intense anxiety.\" Pt says he might even feel worse than he did on admit. Talks with NP about med changes and is agreeable. Pt says he is not ready to go home and \"not going anywhere until I can handle the stress here.\"   "

## 2020-04-06 NOTE — PROGRESS NOTES
"[facility]      Impression:   Patient Jason Ring is a 39 year old male admitted on 4/1/2020 with worsening depression and suicidal ideation with plan.    Patient interviewed today via video conferencing with RN present. Patient reports agitation and anger that have improved since stopping Effexor XR today. Stated this has occurred in past with antidepressants. Discussed mood stabilizers and atypical antipsychotics to address symptoms of depression and mood lability.  Stated he would like \"to avoid antipsychotics\".  Educated regarding medication indications, risks, benefits, side effects, lab draws, contraindications and possible interactions. Verbally expressed understanding. Will start lithium  mg a day.  Asked about vitamin D and will start 2000 units daily as may help with mood. Stated he feels \"worse now than when I came in\".  Has talked with staff about moving out from living with girlfriend. Was given handouts on Bipolar Disorder and Borderline Personality Disorder, which he told staff he has \"every one of these [Personality Disorder]\".  Does not endorse suicidal ideation but depression is persistent. Appetite is good and sleep is reported to be \"ok\". Patient is attending groups, staff report he takes a co-leader role at times.          Diagnoses:   Major Depressive Disorder, Recurrent, moderate  R/O Bipolar II Disorder  Anxiety Disorder, unspecified  R/O Personality Disorder    Attestation:  Patient has been seen and evaluated by me,  RAFAEL Arceo CNP        Interim History:   The patient's care was discussed with the treatment team and chart notes were reviewed.    BEHAVIORAL TEAM DISCUSSION    Progress: minimal  Continued Stay Criteria/Rationale: suicidal ideation with plan, continued depressive symptoms, monitor medication changes.   Medical/Physical: None  Precautions:   Falls precaution?: YES, gait steady  Behavioral Orders   Procedures     Code 1 - " Restrict to Unit     Routine Programming     As clinically indicated     Self Injury Precaution     Status 15     Every 15 minutes.     Plan: Continue inpatient hospitalization, continue to offer therapeutic groups, monitor medication side effects and efficacy, monitor for decrease in suicidal thoughts, decrease in anxiety  Rationale for change in precautions or plan: none    ELOS: 1-3 days for decrease in suicidal ideations, monitoring of new medication and safe discharge plan.    Participants: Jannet Tamayo RAFAEL CNP & Nursing          Medications:     Current Facility-Administered Medications   Medication     acetaminophen (TYLENOL) tablet 650 mg     alum & mag hydroxide-simethicone (MAALOX  ES) suspension 30 mL     cloNIDine (CATAPRES) tablet 0.1 mg     hydrocortisone (CORTAID) 1 % cream     hydrOXYzine (ATARAX) tablet 25-50 mg     hydrOXYzine (ATARAX) tablet  mg     lithium ER (LITHOBID) CR tablet 300 mg     magnesium hydroxide (MILK OF MAGNESIA) suspension 30 mL     nicotine (NICORETTE) gum 2-4 mg     Vitamin D3 (CHOLECALCIFEROL) 25 mcg (1000 units) tablet 50 mcg      10 point ROS positive for dry skin on face.       Allergies:   No Known Allergies         Psychiatric Examination:   /79   Pulse 80   Temp 97  F (36.1  C) (Tympanic)   Resp 16   Wt 75.4 kg (166 lb 4.8 oz)   SpO2 97%   BMI 23.86 kg/m    Weight is 166 lbs 4.8 oz  Body mass index is 23.86 kg/m .    Appearance:  awake, alert, adequately groomed and dressed in hospital scrubs  Attitude:  cooperative  Eye Contact:  fair  Mood:  depressed  Affect:  mood congruent and intensity is heightened  Speech:  clear, coherent  Psychomotor Behavior:  no evidence of tardive dyskinesia, dystonia, or tics  Thought Process:  logical, linear, goal oriented and obsessive thoughts  Associations:  no loose associations  Thought Content:  no evidence of suicidal ideation or homicidal ideation and no evidence of psychotic thought  Insight:  good  Judgment:   "intact  Oriented to:  time, person, and place  Attention Span and Concentration:  intact  Recent and Remote Memory:  intact  Fund of Knowledge: appropriate  Muscle Strength and Tone: normal  Gait and Station: Normal         Labs:   No results found for this or any previous visit (from the past 24 hour(s)).        Video Visit:     Jason Ring is a 39 year old male who is being evaluated via a billable video visit.      The patient has been notified of following:     \"This video visit will be conducted via a call between you and your physician/provider. We have found that certain health care needs can be provided without the need for an in-person physical exam.  This service lets us provide the care you need with a video conversation.  If a prescription is necessary we can send it directly to your pharmacy.  If lab work is needed we can place an order for that and you can then stop by our lab to have the test done at a later time.    If during the course of the call the physician/provider feels a video visit is not appropriate, you will not be charged for this service.\"     Patient has given verbal consent for Video visit? Yes    Video Start Time: 13:32    I have reviewed and updated the patient's Past Medical History, Social History, Family History and Medication List as above.    Video-Visit Details    Type of service:  Video Visit    Video End Time (time video stopped): 13:38    Originating Location (pt. Location): [facility] [place] In[facility]    Mode of Communication:  Video Conference via [employer]      "

## 2020-04-06 NOTE — PLAN OF CARE
"D: Jason was seen as scheduled. He presented as O x 4 ,coherent,relevant,and cooperative. He denied any threat of harm to himself and/or others. He contracted  for safety. The pt did report some moderate agitation which he attributed to his medication. The pt reported that he did not want to leave the hospital because he said that he did not want to return again too soon.    He expressed concern that he might be a Borderline. Discusses Borderline traits with him. He expressed concern that he has issues around control and when he sees things not going his way \"putting up walls\"    The purpose of therapy was discussed with the pt. Will not terminate today.    A;Will accept regular calls for check-in when discharged.    P: See ptt as needed and/or requested.    "

## 2020-04-06 NOTE — PLAN OF CARE
Observed pt in the [facility] area watching television with female peer at aft/noc shift change - did extend greetings to oncoming staff appropriately. Did go to his room at approx. 2330 and is lying in a supine position on top of his covers with headphones playing a radio station at this time - volume is low.

## 2020-04-06 NOTE — PLAN OF CARE
"It is now  0625 and pt has slept for most of the noc - approx 6.5 hours - is [facility] visiting with female peer - did request and receive lotion \"for my feet\" polite, pleasant, and appropriate.  "

## 2020-04-06 NOTE — PLAN OF CARE
"GALI BRISCOE RN  4/6/2020  5:17 PM  Face to face shift report received from ALYSE Clemente. Rounding completed, pt observed.     1800-Pt ate 100% of supper.  Attends groups.  States he has \"high anxiety- I have so many emotions I don't know what to do\".  Educated pt on coping mechanisms- pt very open to education.  Pt reports clonidine Does not help\".  Hydroxazine does somewhat help \"knocks the edge off\".  Requested info on hydroxazine, printed it out and gave to pt.  Denies SI, HI, pain and hallucinations.  Cooperative with staff.   2230- Hydroxazine 100 mg was given at HS per pt request.  Pt had a nice night out in [facility] with other pt's.  Offers no complaints.       Problem: Adult Behavioral Health Plan of Care  Goal: Patient-Specific Goal (Individualization)  Description: Pt will sleep 6-8 hours per night.   Pt will eat 50% of meals and snacks.   Pt will attend 50% of groups.   4/6/2020 1716 by Gali Briscoe, RN  Outcome: Improving     Problem: Suicide Risk  Goal: Absence of Self-Harm  Description: Pt will be free from self injury while on the unit.   4/6/2020 1716 by Gali Briscoe, RN  Outcome: Improving     Face to face end of shift report communicated to oncmireya shift RN.            "

## 2020-04-06 NOTE — PLAN OF CARE
Problem: Depression  Goal: Improved Mood  Description: Pt will report decrease in depression by target date.   Pt will attend 50% of groups.   4/6/2020 0750 by Desiree Carrizales RN  Outcome: Improving     Problem: Suicide Risk  Goal: Absence of Self-Harm  Description: Pt will be free from self injury while on the unit.   4/6/2020 0750 by Desiree Carrizales RN  Outcome: Improving     Problem: Adult Behavioral Health Plan of Care  Goal: Patient-Specific Goal (Individualization)  Description: Pt will sleep 6-8 hours per night.   Pt will eat 50% of meals and snacks.   Pt will attend 50% of groups.   Outcome: Improving  Note:      Pt. Reports a decrease in depression, denies suicidal ideation, attending most group therapy sessions, has remained free from injury/harm, slept 6.5 hours last night, eating at least 50% of meals and snacks,  Spending most of time in lounge, social with staff and peers.  1317- Pt. Requested/received clonidine 0.1 mg po for anxiety.    Face to face end of shift report will be communicated to oncoming afternoon shift RN.     Desiree Carrizales RN  4/6/2020  9:36 AM

## 2020-04-07 PROCEDURE — 25000132 ZZH RX MED GY IP 250 OP 250 PS 637: Performed by: NURSE PRACTITIONER

## 2020-04-07 PROCEDURE — 12400000 ZZH R&B MH

## 2020-04-07 PROCEDURE — 99232 SBSQ HOSP IP/OBS MODERATE 35: CPT | Mod: 95 | Performed by: NURSE PRACTITIONER

## 2020-04-07 RX ADMIN — MELATONIN 50 MCG: at 08:13

## 2020-04-07 RX ADMIN — HYDROXYZINE HYDROCHLORIDE 50 MG: 25 TABLET, FILM COATED ORAL at 09:43

## 2020-04-07 RX ADMIN — LITHIUM CARBONATE 300 MG: 300 TABLET, EXTENDED RELEASE ORAL at 20:30

## 2020-04-07 RX ADMIN — LITHIUM CARBONATE 300 MG: 300 TABLET, EXTENDED RELEASE ORAL at 08:13

## 2020-04-07 ASSESSMENT — ACTIVITIES OF DAILY LIVING (ADL)
DRESS: SCRUBS (BEHAVIORAL HEALTH);INDEPENDENT
HYGIENE/GROOMING: INDEPENDENT
LAUNDRY: UNABLE TO COMPLETE
ORAL_HYGIENE: INDEPENDENT

## 2020-04-07 NOTE — PLAN OF CARE
Face to face shift report received from Wanda CHOI RN. Rounding completed, pt observed.   Problem: Depression  Goal: Improved Mood  Description: Pt will report decrease in depression by target date.   Pt will attend 50% of groups.   4/7/2020 0748 by Alexandra Pelayo RN  Outcome: No Change     Problem: Suicide Risk  Goal: Absence of Self-Harm  Description: Pt will be free from self injury while on the unit.   4/7/2020 0748 by Alexandra Pelayo RN  Outcome: No Change     Problem: Adult Behavioral Health Plan of Care  Goal: Patient-Specific Goal (Individualization)  Description: Pt will sleep 6-8 hours per night.   Pt will eat 50% of meals and snacks.   Pt will attend 50% of groups.   4/7/2020 0748 by Alexandra Pelayo RN  Outcome: No Change  Note: Shift Summery:     PT. Denies al  SI and has no plan. Pt.  denies all pain.  Pt. Has been present in unit milieu, his mood is relaxed, and overall appearance is groomed. Pt. Met with provider and that well well. Pt. Is a positive discharge at the end of the week. Pt. Started on lithium 300 on 4/6/20 and is due for a lab draw in five days.At 09  pt.reported feeling anxious and requested A prn 7/10 anxiety. ATARAX 50 mg was administered at 0943. Prn was effective.     Nursing will continue to monitor pt. For any changes in behavior.      Face to face report will be communicated to oncmireya CULLEN.    Alexandra Pelayo RN  4/7/2020  7:51 AM
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
## 2020-04-07 NOTE — PLAN OF CARE
"Met with pt this morning with NP- Pt reports still feeling a bit agitated today but not as much as the past couple of days. Pt says he has been working with the unit therapist. Pt explains that he has a hard time focusing and talks about having possible ptsd symptoms due to his childhood. Pt says he has been \"de la rosa\" forever. Says he does feel hopeful now after starting a new medication.   "

## 2020-04-07 NOTE — PLAN OF CARE
Problem: Adult Behavioral Health Plan of Care  Goal: Patient-Specific Goal (Individualization)  Description: Pt will sleep 6-8 hours per night.   Pt will eat 50% of meals and snacks.   Pt will attend 50% of groups.   4/7/2020 0021 by Wanda Padilla RN  Outcome: No Change  Note:        Problem: Depression  Goal: Improved Mood  Description: Pt will report decrease in depression by target date.   Pt will attend 50% of groups.   Outcome: No Change     Problem: Suicide Risk  Goal: Absence of Self-Harm  Description: Pt will be free from self injury while on the unit.   4/7/2020 0021 by Wanda Padilla, RN  Outcome: No Change     Face to face shift report received from Carrie CULLEN. Rounding completed, pt observed.     Pt appeared to be sleeping most of this shift, normal respirations and position changes noted. Pt did not have any noted episodes of self harm or injury this shift.     Face to face report will be communicated to oncmireya RN.    Wanda Padilla RN  4/7/2020  6:14 AM

## 2020-04-08 ENCOUNTER — APPOINTMENT (OUTPATIENT)
Dept: OCCUPATIONAL THERAPY | Facility: HOSPITAL | Age: 40
End: 2020-04-08
Attending: NURSE PRACTITIONER
Payer: COMMERCIAL

## 2020-04-08 PROCEDURE — 97165 OT EVAL LOW COMPLEX 30 MIN: CPT | Mod: GO

## 2020-04-08 PROCEDURE — 12400000 ZZH R&B MH

## 2020-04-08 PROCEDURE — 25000132 ZZH RX MED GY IP 250 OP 250 PS 637: Performed by: NURSE PRACTITIONER

## 2020-04-08 PROCEDURE — 99232 SBSQ HOSP IP/OBS MODERATE 35: CPT | Mod: 95 | Performed by: NURSE PRACTITIONER

## 2020-04-08 RX ADMIN — MELATONIN 50 MCG: at 08:21

## 2020-04-08 RX ADMIN — LITHIUM CARBONATE 300 MG: 300 TABLET, EXTENDED RELEASE ORAL at 08:21

## 2020-04-08 RX ADMIN — LITHIUM CARBONATE 300 MG: 300 TABLET, EXTENDED RELEASE ORAL at 19:55

## 2020-04-08 ASSESSMENT — ACTIVITIES OF DAILY LIVING (ADL)
DRESS: INDEPENDENT;SCRUBS (BEHAVIORAL HEALTH)
HYGIENE/GROOMING: INDEPENDENT
ORAL_HYGIENE: INDEPENDENT

## 2020-04-08 NOTE — PLAN OF CARE
"GALI BRISCOE RN  4/8/2020  3:44 PM  Face to face shift report received from Patricia CULLEN. Rounding completed, pt observed.     1827- Pt reports anxiety a 4/10.  Denies depression, hallucinations, SI, HI, and pain.  Excited about discharge, stating \"I wish it was Friday\".  Verbalized appreciation for his job as they want him to take additional week off and get settled in after discharge.  Interacts appropriately with other pts.  Cooperative with treatment plan.  1041-Pt went to groups.  Cooperative all shift.  Offers no complaints.  Currently sleeping.    Problem: Depression  Goal: Improved Mood  Description: Pt will report decrease in depression by target date.   Pt will attend 50% of groups.   4/8/2020 1542 by Gali Briscoe, RN  Outcome: Improving     Problem: Suicide Risk  Goal: Absence of Self-Harm  Description: Pt will be free from self injury while on the unit.   4/8/2020 1542 by Gali Briscoe, RN  Outcome: Improving     Problem: Adult Behavioral Health Plan of Care  Goal: Patient-Specific Goal (Individualization)  Description: Pt will sleep 6-8 hours per night.   Pt will eat 50% of meals and snacks.   Pt will attend 50% of groups.   4/8/2020 1542 by Gali Briscoe, RN  Outcome: Improving    Face to face end of shift report communicated to oncmireya shift RN.               "

## 2020-04-08 NOTE — PLAN OF CARE
Discharge Planner OT   Patient plan for discharge: return home with outpatient services  Current status: eval completed.  Pt made a weighted collar.  Handout and education provided on the use of weighted items for coping and calming.    Barriers to return to prior living situation: impaired coping skills  Recommendations for discharge: recommend pt return home with outpatient therapy  Rationale for recommendations: per eval        Entered by: Gali Mata 04/08/2020 3:35 PM

## 2020-04-08 NOTE — PLAN OF CARE
"Met with pt this morning with NP- He states he is doing \"much better\" and feeling back to his normal self. States he didn't sleep very well last night. Says he has some anxiety abut going back home and getting back to work. Says he has been attending group programming, meeting with inpt therapist, and \"picking Ed's brain at night.\" Pt says he called his girlfriend yesterday and they had a good conversation that was healthy and this relieved some of his anxiety. Pt says when he gets home he wont be rushing back to work as he talked with his boss and he will be taking a week off when he returns home which will likely be over the weekend.     Spoke with pt this afternoon- He states he is feeling ready for discharge and does not want to leave here any later than Saturday. Informed NP of this.   "

## 2020-04-08 NOTE — PROGRESS NOTES
"Behavioral Health Occupational Therapy Eval      Name: Jason Ring MRN# [id number]   Age: 39 year old YOB: 1980     Date of Consultation: April 8, 2020  Primary care provider: No Ref-Primary, Physician    Referring Physician: Skylar Garcia   Orders: Eval and Treat  Medical Diagnosis: moderate episode of recurrent major depressive disorder  Onset of Illness/Injury: 4/1/20    Prior Level of Function: Pt is a 39 year old male admitted to our unit with suicidal ideation.  Stressors include heavy workload and long hours at his job, worsening feelings of hopelessness and thoughts if suicide.  Reports getting in an argument with his girlfriend and felt like she was not supportive of how he was thinking.  Pt lives in a home he owns with is girlfriend and two children.  Is independent with all his own cares and home management but admits that he just needs a break and to \"escape\" his thoughts.       Current Level of Function: Pt has been bright and attending groups.  Admits to some irritability related to a peer on the unit.  States that he feels ready to go and that he got a lot out of his stay hear but \"im ready to go and feeling back to myself\".  Pt made a weighted collar for coping and calming.  Handouts issued on the sense of touch and the benefits of deep pressure touch for coping and calming.      Patient/Family Goal: Pt would like to discharge tomorrow    Fall Screen:   Have you fallen 2 or more times in the last year? No  Have you fallen and had an injury in the last year? No  Timed up & go: NA  Is patient a fall risk? No    Past Medical History:   Past Medical History:   Diagnosis Date     Closed left ankle fracture      Conjunctivitis, acute 1/6/2011     Depression with suicidal ideation 4/1/2020     Depressive disorder      Family history of heart disease in male family member before age 55 8/21/2017     Foreign body in eye 1/6/2011     Moderate episode of recurrent major depressive disorder " (H) 8/21/2017     Seborrheic dermatitis 7/24/2018     Smoker 7/13/2018       Past Surgical History:  Past Surgical History:   Procedure Laterality Date     ORTHOPEDIC SURGERY         Medications:   Current Facility-Administered Medications   Medication     acetaminophen (TYLENOL) tablet 650 mg     alum & mag hydroxide-simethicone (MAALOX  ES) suspension 30 mL     cloNIDine (CATAPRES) tablet 0.1 mg     hydrocortisone (CORTAID) 1 % cream     hydrOXYzine (ATARAX) tablet 25-50 mg     hydrOXYzine (ATARAX) tablet  mg     lithium ER (LITHOBID) CR tablet 300 mg     magnesium hydroxide (MILK OF MAGNESIA) suspension 30 mL     nicotine (NICORETTE) gum 2-4 mg     selenium sulfide (SELSUN) 1 % lotion     Vitamin D3 (CHOLECALCIFEROL) 25 mcg (1000 units) tablet 50 mcg       Reason for OT Referral:  Mental Health History: past psych admit in 2014  Signs/Symptoms of compliant: anxiety, irritability  Aggravating factors/Current Life Stressors: long hours at work, caring for children, argument with girlfriend  Current Services: therapy and psychiatry    Personal Information:  Family Structure: lives with girlfriend and two young children  Living Arrangement: lives in a home he owns   Finances: works full time plus manager at [employer]  Medication Management: unknown  Support System:girlfriend, therapy and psychiatry     Physical Presentation:   Mobility: no concerns  Strength/ROM: WFL   Comfort/Pain: none reported at this time  Sensory: interested in creating a weighted collar for coping and calming     Self Care:   Nutrition: I do okay  Sleep Pattern: I do okay  Exercise: I do okay  Spiritual Practice: I do okay  Leisure: I do okay  Coping Skills: I do okay    Cognition:  Orientation:  time, place and person  Memory: Intact  Safety awareness: Intact  Attention: Normal   Motivation: Normal   Judgement/Insight: Normal   Speech/Language: Normal  Mood: Neutral  Affect: Appropriate  Thought Content: appropriate    Goals:   Pt will  verbalize understanding of 2-3 new healthy coping skills in order to aide in emotion regulation.      Planned Interventions: create a weighted collar, education on mandalynth tracing therapy and mindfulness    Clinical Impressions:  Criteria for Skilled Therapeutic Intervention Met: yes  OT Diagnosis: impaired coping skills  Influenced by the following impairments: depression, poor sleep, work load at work, argument with girlfriend  Functional limitations due to impairment: sleep, home management, , self care  Clinical presentation: Stable/Uncomplicated  Clinical presentation rationale: per pt report and chart review  Clinical Decision making (complexity): Low Complexity  Predicted Duration of Therapy Intervention (days/wks): 2 x week x 2 weeks  Risks and Benefits of therapy have been explained: Yes  Patient, Family & other staff in agreement with plan of care: Yes  Comments: Pt is appropriate for OT services to address coping skills.  Pt expresses interest in weighted sensory items for coping and calming.  Also states that since being here he has learned how much he enjoys mindfulness and would like his care after discharge to be focused on that.      Total Evaluation Time: 18

## 2020-04-08 NOTE — PLAN OF CARE
"Face to face end of shift report received from Vicenta MCDANIEL RN. Rounding completed and patient observed in the [facility]. No requests at this time.    13:30 Update: Patient rated anxiety at a 4 of 10 and denied depression, HI/SI and hallucinations. He stated he slept lightly last night and \"never got REM sleep.\" He said that is not his normal but he still felt rested. He explained this is the first day he feels no effects from the Effexor and believes it is all out of his system. Patient denied pain. He attended groups. He is observed to be social with peers and smiles and laughs with them. He did complain of slight dry mouth and an intermittent flash of warmth through his upper body which he said only lasts for a few seconds. He said these are not distressing but he things they could be from the lithium. Patient denied needing any PRNs. He plans to go home on Saturday after his lithium labs. He also reported he called his girlfriend last night and they \"worked it out.\"     Face to face end of shift report communicated to oncoming RN.       Problem: Adult Behavioral Health Plan of Care  Goal: Patient-Specific Goal (Individualization)  Description: Pt will sleep 6-8 hours per night.   Pt will eat 50% of meals and snacks.   Pt will attend 50% of groups.   4/8/2020 1330 by Patricia Lobo RN  Outcome: Improving     Problem: Suicide Risk  Goal: Absence of Self-Harm  Description: Pt will be free from self injury while on the unit.   Outcome: Improving     Problem: Depression  Goal: Improved Mood  Description: Pt will report decrease in depression by target date.   Pt will attend 50% of groups.   Outcome: Improving     "

## 2020-04-08 NOTE — PLAN OF CARE
D: Jason was seen this morning for a scheduled follow-up. He presented as Ox4,coherent,relevant and pleasant,He denied S/H ideation. He was willing to contract for safety. The pt reported that he is doing much better since the Effexor has been stop and he has been placed on lithium. Cooper.reports that he is less agitated than he was yesterday..    Jason said that he has had a conversation with his wife and it appeared to go well. Discussed active listening. The pt also reported his travel through out the [place] including [place]. He met his first wife in [place] . His first wife brought him to [place].    A; Somewhat improved,      P: See as needed and/or requested.

## 2020-04-08 NOTE — PLAN OF CARE
Face to face shift report received from Chyna MCDANIEL RN. Patient observed.      Problem: Adult Behavioral Health Plan of Care  Goal: Patient-Specific Goal (Individualization)  Description: Pt will sleep 6-8 hours per night.   Pt will eat 50% of meals and snacks.   Pt will attend 50% of groups.   4/8/2020 0113 by Vicenta Mohamud RN  Outcome: No Change  Note: Patient has been in bed with eyes closed and regular respirations throughout the night. 15 minute checks completed all night. No complaints offered. Will continue to monitor.    The face to face report will be communicated to on coming RN.

## 2020-04-08 NOTE — PROGRESS NOTES
"[facility]  Psychiatric Progress Note       Impression:   Patient Jason Ring is a 39 year old male admitted on 4/1/2020 with worsening depression and suicidal ideation with plan.     Jason is seen today via video conferencing with SW and RN present. He states that he woke up feeling \"much better\" and feels like he is back to his \"normal self\". He states \"no negative thoughts\". He felt that the Effexor was causing an increase in agitation, but I think its out of my system now\". He states that he did call his girlfriend yesterday and they had a good conversation. Felt that this helped to alleviate some of his anxiety about going home. Does report some anxiety regarding returning to work, though had spoken with his boss and was granted an additional week off for follow-up appointments. He denies any suicidal ideation. He states that he felt \"flushed\" over his upper body at one point yesterday, though it quickly subsided. \"It didn't feel like normal anxiety\". Will continue with Lithium at this time. Will have blood draw at the end of the week and likely discharge home over the weekend.           Diagnoses:   Major Depressive Disorder, Recurrent, moderate  R/O Bipolar II Disorder  Anxiety Disorder, unspecified  Trauma or Stressor-Related Disorder  R/O Personality Disorder     Attestation:  Patient has been seen and evaluated by me,  RAFAEL Olson CNP         Interim History:   The patient's care was discussed with the treatment team and chart notes were reviewed.     BEHAVIORAL TEAM DISCUSSION    Progress: fair  Continued Stay Criteria/Rationale: start lithium and check labwork, monitor for mood stability, recent SI, set up safe discharge plan  Medical/Physical: None  Precautions:   Falls precaution?: YES, gait steady  Behavioral Orders   Procedures     Code 1 - Restrict to Unit     Routine Programming     As clinically indicated     Self Injury Precaution     Status 15     Every 15 minutes. " "    Plan:   Continue Lithium for mood stability.   Labs on 4/11 in AM: lithium level, TSH, Vit D  May use alpha stem up to three times a day  Likely discharge home this weekend    Rationale for change in precautions or plan: none     ELOS: 3-5 days for decrease in suicidal ideations, decreased agitation, Decreased anxiety, monitoring of new medication and safe discharge plan.     Participants: Skylar Garcia RAFAEL CNP & Nursing       Medications:          Current Facility-Administered Medications   Medication     acetaminophen (TYLENOL) tablet 650 mg     alum & mag hydroxide-simethicone (MAALOX  ES) suspension 30 mL     cloNIDine (CATAPRES) tablet 0.1 mg     hydrocortisone (CORTAID) 1 % cream     hydrOXYzine (ATARAX) tablet 25-50 mg     hydrOXYzine (ATARAX) tablet  mg     lithium ER (LITHOBID) CR tablet 300 mg     magnesium hydroxide (MILK OF MAGNESIA) suspension 30 mL     nicotine (NICORETTE) gum 2-4 mg     Vitamin D3 (CHOLECALCIFEROL) 25 mcg (1000 units) tablet 50 mcg      10 point ROS positive for dry skin on face.       Allergies:   No Known Allergies          Psychiatric Examination:   /79   Pulse 80   Temp 97  F (36.1  C) (Tympanic)   Resp 16   Wt 75.4 kg (166 lb 4.8 oz)   SpO2 97%   BMI 23.86 kg/m    Weight is 166 lbs 4.8 oz  Body mass index is 23.86 kg/m .     Appearance:  awake, alert, adequately groomed and dressed in hospital scrubs  Attitude:  cooperative, pleasant  Eye Contact: good  Mood: \"much better\", less depressed  Affect: full range  Speech:  clear, coherent  Psychomotor Behavior:  no evidence of tardive dyskinesia, dystonia, or tics  Thought Process:  logical, linear, goal oriented  Associations:  no loose associations  Thought Content: denies any suicidal or homicidal ideation, denies hallucinations  Insight:  good  Judgment:  intact  Oriented to:  time, person, and place  Attention Span and Concentration:  intact  Recent and Remote Memory:  intact  Fund of Knowledge: " "appropriate for education  Muscle Strength and Tone: normal  Gait and Station: Normal          Labs:   No results found for this or any previous visit (from the past 24 hour(s)).         Video Visit:      Jason Ring is a 39 year old male who is being evaluated via a billable video visit.       The patient has been notified of following:      \"This video visit will be conducted via a call between you and your physician/provider. We have found that certain health care needs can be provided without the need for an in-person physical exam.  This service lets us provide the care you need with a video conversation.  If a prescription is necessary we can send it directly to your pharmacy.  If lab work is needed we can place an order for that and you can then stop by our lab to have the test done at a later time.     If during the course of the call the physician/provider feels a video visit is not appropriate, you will not be charged for this service.\"      Patient has given verbal consent for Video visit? Yes     Video Start Time: 1100     I have reviewed and updated the patient's Past Medical History, Social History, Family History and Medication List as above.     Video-Visit Details     Type of service:  Video Visit     Video End Time (time video stopped): 1110    Originating Location (pt. Location): [facility] Inpatient Behavioral Health     Mode of Communication:  Video Conference via M/A-COM Meeting Lay     Skylar Garcia CNP  "

## 2020-04-08 NOTE — PLAN OF CARE
"  Problem: Depression  Goal: Improved Mood  Description: Pt will report decrease in depression by target date.   Pt will attend 50% of groups.   Note: Pt reports an increase in mood today from yesterday.  He stated \"i'm starting to feel like myself again.\"      Problem: Suicide Risk  Goal: Absence of Self-Harm  Description: Pt will be free from self injury while on the unit.   Note: Pt denied SI.      Problem: Adult Behavioral Health Plan of Care  Goal: Patient-Specific Goal (Individualization)  Description: Pt will sleep 6-8 hours per night.   Pt will eat 50% of meals and snacks.   Pt will attend 50% of groups.   Note: Pt was cooperative with nursing assessment and medications.  He stated he was feeling agitated until 1030 and after that he has been feeling like himself again.  He is pleasant this evening shift.  Going to group, visited with peers.  He did report dry mouth and a flushing feeling this evening.  Pt ate well and has no complaints.  He would like to get his vitamin D levels checked when he has his lab draw for his lith level.         "

## 2020-04-09 ENCOUNTER — APPOINTMENT (OUTPATIENT)
Dept: OCCUPATIONAL THERAPY | Facility: HOSPITAL | Age: 40
End: 2020-04-09
Attending: PSYCHIATRY & NEUROLOGY
Payer: COMMERCIAL

## 2020-04-09 PROCEDURE — 12400000 ZZH R&B MH

## 2020-04-09 PROCEDURE — 97530 THERAPEUTIC ACTIVITIES: CPT | Mod: GO

## 2020-04-09 PROCEDURE — 99232 SBSQ HOSP IP/OBS MODERATE 35: CPT | Mod: 95 | Performed by: NURSE PRACTITIONER

## 2020-04-09 PROCEDURE — 25000132 ZZH RX MED GY IP 250 OP 250 PS 637: Performed by: NURSE PRACTITIONER

## 2020-04-09 RX ORDER — BENZOCAINE/MENTHOL 6 MG-10 MG
LOZENGE MUCOUS MEMBRANE 2 TIMES DAILY PRN
COMMUNITY
Start: 2020-04-09

## 2020-04-09 RX ORDER — LITHIUM CARBONATE 300 MG/1
300 TABLET, FILM COATED, EXTENDED RELEASE ORAL EVERY MORNING
Status: DISCONTINUED | OUTPATIENT
Start: 2020-04-10 | End: 2020-04-10 | Stop reason: HOSPADM

## 2020-04-09 RX ORDER — CHOLECALCIFEROL (VITAMIN D3) 50 MCG
50 TABLET ORAL DAILY
COMMUNITY
Start: 2020-04-10

## 2020-04-09 RX ADMIN — LITHIUM CARBONATE 300 MG: 300 TABLET, EXTENDED RELEASE ORAL at 08:56

## 2020-04-09 RX ADMIN — MELATONIN 50 MCG: at 08:56

## 2020-04-09 RX ADMIN — HYDROXYZINE HYDROCHLORIDE 50 MG: 25 TABLET, FILM COATED ORAL at 19:49

## 2020-04-09 ASSESSMENT — ACTIVITIES OF DAILY LIVING (ADL)
DRESS: SCRUBS (BEHAVIORAL HEALTH);INDEPENDENT
ORAL_HYGIENE: INDEPENDENT
HYGIENE/GROOMING: INDEPENDENT

## 2020-04-09 NOTE — DISCHARGE INSTRUCTIONS
Behavioral Discharge Planning and Instructions    Summary: Patient was admitted with SI and plan to jump off of a bridge     Main Diagnosis: Major Depressive Disorder, Recurrent, moderate  Anxiety Disorder, unspecified    Major Treatments, Procedures and Findings: Stabilize with medications, connect with community programs.    Symptoms to Report: feeling more aggressive, increased confusion, losing more sleep, mood getting worse or thoughts of suicide    Lifestyle Adjustment: Take all medications as prescribed, meet with doctor/ medication provider, out patient therapist, , and [facility] worker as scheduled. Abstain from alcohol or any unprescribed drugs.    Psychiatry Follow-up:     [facility] crisis line - [phone]    [facility]   Therapy- Anurag Ada   Phone: [phone]     [facility]   - Whit- As needed   Phone: [phone] ext [phone]      [facility]  PCP- Dr. Oconnor - April 21st @ 11:30am   [address]  [place] [address]   Phone: [phone]  Fax: [phone]    [facility]   CBT Therapist- Desiree Haider- April 16th @ 1pm- check your email as intake forms will be coming to have completed prior to apt.   [address]  [place] [address]   Phone: [phone]  Fax: [phone]       Previous Providers:     Psychiatric Services  Medication Management- Deana Flores  [address] [facility]  [address]  [place] [address]   Phone: [phone]      Therapist- Brandon Prieto   [address] [facility] [address]  [place] [address]   Phone: [phone]      Resources:   Crisis Intervention: 726.904.8360 or 262-170-9788 (TTY: 827.475.6328).  Call anytime for help.  [place] on [place] Illness (www.[place].leonardo.org): 728.668.6671 or 132-071-8725.  Alcoholics Anonymous (www.alcoholics-anonymous.org): Check your phone book for your local chapter.  Suicide Awareness Voices of Education (SAVE) (www.save.org):  "888-511-SAVE (7283)  National Suicide Prevention Line (www.mentalhealthmn.org): 357-014-PSBQ (2864)  Mental Health Consumer/Survivor Network of [place] (www.mhcsn.net): 671.929.8583 or 866-538-1720  Mental Health Association of [place] (www.mentalhealth.org): 904.155.4692 or 998-631-9870    General Medication Instructions:   See your medication sheet(s) for instructions.   Take all medicines as directed.  Make no changes unless your doctor suggests them.   Go to all your doctor visits.  Be sure to have all your required lab tests. This way, your medicines can be refilled on time.  Do not use any drugs not prescribed by your doctor.  Avoid alcohol.      [place] Statewide:  [place] Crisis and Referral Services: 0-038-480-8154  National Suicide Prevention Lifeline: 2-004-208-TALK (0821)   - dob4zous- Text \"Life\" to 33489  First Call for Help: 2-1-1  [facility] Helpline- 8-842-QEEQ-HELP      Please use the information at the end of this document to sign up for Blip where you can get your results and a message about those results sent to you through the Soshowise application. If you do not have SNOBSWAPhart we will call you with your results but it may take longer.    Regardless of if you have been tested or not:  Patient who have symptoms (cough, fever, or shortness of breath), need to isolate for 7 days from when symptoms started AND 72 hours after fever resolves (without fever reducing medications) AND improvement of respiratory symptoms (whichever is longer).      Isolate yourself at home (in own room/own bathroom if possible)    Do Not allow any visitors    Do Not go to work or school    Do Not go to Christian,  centers, shopping, or other public places.    Do Not shake hands.    Avoid close and intimate contact with others (hugging, kissing).    Follow CDC recommendations for household cleaning of frequently touched services.     After the initial 7 days, continue to isolate yourself from household members as much as " possible. To continue decrease the risk of community spread and exposure, you and any members of your household should limit activities in public for 14 days after starting home isolation.     You can reference the following CDC link for helpful home isolation/care tips:  https://www.cdc.gov/coronavirus/2019-ncov/downloads/10Things.pdf    Protect Others:    Cover Your Mouth and Nose with a mask, disposable tissue or wash cloth to avoid spreading germs to others.    Wash your hands and face frequently with soap and water    Call Back If: Breathing difficulty develops or you become worse.    For more information about COVID19 and options for caring for yourself at home, please visit the CDC website at https://www.cdc.gov/coronavirus/2019-ncov/about/steps-when-sick.html  For more options for care at [facility], please visit our website at [email or web address]

## 2020-04-09 NOTE — PLAN OF CARE
Face to face end of shift report received from Ting MANCERA RN. Rounding completed and patient observed in the lounge for breakfast. No requests at this time.    14:00 Update: Patient endorsed minimal anxiety and depression. He denied HI/SI and hallucinations. Patient denied pain. He reported he did not sleep well last night. Patient's affect is bright. He attends groups and is social with peers. He maintains appropriate boundaries. Patient is well groomed. Patient denied needing any PRNs. Patient verbalized his goal is to not take meds but take a more holistic approach. When questioned about his ideas, he becomes defensive.      Face to face end of shift report communicated to oncoming RN.       Problem: Adult Behavioral Health Plan of Care  Goal: Patient-Specific Goal (Individualization)  Description: Pt will sleep 6-8 hours per night.   Pt will eat 50% of meals and snacks.   Pt will attend 50% of groups.   4/9/2020 0801 by Patricia Lobo RN  Outcome: Improving     Problem: Suicide Risk  Goal: Absence of Self-Harm  Description: Pt will be free from self injury while on the unit.   Outcome: Improving     Problem: Depression  Goal: Improved Mood  Description: Pt will report decrease in depression by target date.   Pt will attend 50% of groups.   Outcome: Improving

## 2020-04-09 NOTE — PLAN OF CARE
D: PT was seen this morning to terminate. Reviewed what was discussed. He has agreedtto speak with me weekly by telephone.  A:Mood D.O.,N.O.S.  P: Contact pt on Monday April 13.

## 2020-04-09 NOTE — PROGRESS NOTES
[facility]  Psychiatric Progress Note       Impression:   Patient Jason Ring is a 39 year old male admitted on 4/1/2020 with worsening depression and suicidal ideation with plan.     Jason is seen today via video conferencing with ADOLFO and RN present. He denies any suicidal thoughts today, states that mood has seemed to improve. He does state that he feels the Lithium is causing side effects. States that he is experiencing a slight ringing in his ears, and had felt flushed yesterday. We discussed pros and cons and agreed to wean off the Lithium at this time. He is interested in getting started with individual therapy and has an appointment scheduled next week. He has been attending group therapy and did meet with OT yesterday to make sensory items and work on coping skills. He does feel that these have been helpful. Current plan is for discharge tomorrow back home, will need assistance with transportation as he is from the [facility].          Diagnoses:   Major Depressive Disorder, Recurrent, moderate  R/O Bipolar II Disorder  Anxiety Disorder, unspecified  Trauma or Stressor-Related Disorder  R/O Personality Disorder     Attestation:  Patient has been seen and evaluated by me,  RAFAEL Olson CNP         Interim History:   The patient's care was discussed with the treatment team and chart notes were reviewed.     BEHAVIORAL TEAM DISCUSSION    Progress: improving  Continued Stay Criteria/Rationale: decrease to stop lithium d/t side effects, monitor for continued mood stability  Medical/Physical: None  Precautions:   Falls precaution?: YES, gait steady  Behavioral Orders   Procedures     Code 1 - Restrict to Unit     Routine Programming     As clinically indicated     Self Injury Precaution     Status 15     Every 15 minutes.     Plan:   Decrease Lithium to once daily today and tomorrow, then discontinue  OT referral- sensory, coping skills  May use alpha stem up to three times a day  Will  discharge home tomorrow    Rationale for change in precautions or plan: none        Participants: Skylar HALL CNP, SW, Nursing       Medications:          Current Facility-Administered Medications   Medication     acetaminophen (TYLENOL) tablet 650 mg     alum & mag hydroxide-simethicone (MAALOX  ES) suspension 30 mL     cloNIDine (CATAPRES) tablet 0.1 mg     hydrocortisone (CORTAID) 1 % cream     hydrOXYzine (ATARAX) tablet 25-50 mg     hydrOXYzine (ATARAX) tablet  mg     lithium ER (LITHOBID) CR tablet 300 mg     magnesium hydroxide (MILK OF MAGNESIA) suspension 30 mL     nicotine (NICORETTE) gum 2-4 mg     Vitamin D3 (CHOLECALCIFEROL) 25 mcg (1000 units) tablet 50 mcg      10 point ROS positive for dry skin on face       Allergies:   No Known Allergies          Psychiatric Examination:   /79   Pulse 80   Temp 97  F (36.1  C) (Tympanic)   Resp 16   Wt 75.4 kg (166 lb 4.8 oz)   SpO2 97%   BMI 23.86 kg/m    Weight is 166 lbs 4.8 oz  Body mass index is 23.86 kg/m .     Appearance:  awake, alert, adequately groomed and dressed in hospital scrubs  Attitude:  cooperative, pleasant  Eye Contact: good  Mood: improving, soma anxiety remains  Affect: full range  Speech:  clear, coherent  Psychomotor Behavior:  no evidence of tardive dyskinesia, dystonia, or tics  Thought Process:  logical, linear, goal oriented  Associations:  no loose associations  Thought Content: denies any suicidal or homicidal ideation, denies hallucinations  Insight:  good  Judgment:  intact  Oriented to:  time, person, and place  Attention Span and Concentration:  intact  Recent and Remote Memory:  intact  Fund of Knowledge: appropriate for education  Muscle Strength and Tone: normal  Gait and Station: Normal          Labs:   No results found for this or any previous visit (from the past 24 hour(s)).         Video Visit:      Jason Ring is a 39 year old male who is being evaluated via a billable video visit.       The  "patient has been notified of following:      \"This video visit will be conducted via a call between you and your physician/provider. We have found that certain health care needs can be provided without the need for an in-person physical exam.  This service lets us provide the care you need with a video conversation.  If a prescription is necessary we can send it directly to your pharmacy.  If lab work is needed we can place an order for that and you can then stop by our lab to have the test done at a later time.     If during the course of the call the physician/provider feels a video visit is not appropriate, you will not be charged for this service.\"      Patient has given verbal consent for Video visit? Yes     Video Start Time: 1110     I have reviewed and updated the patient's Past Medical History, Social History, Family History and Medication List as above.     Video-Visit Details     Type of service:  Video Visit     Video End Time (time video stopped): 1120    Originating Location (pt. Location): [facility] Inpatient [facility]     Mode of Communication:  Video Conference via [employer] ([employer]) Meeting Lay     Skylar Garcia CNP  "

## 2020-04-09 NOTE — PLAN OF CARE
Discharge Planner OT   Patient plan for discharge: return home after lithium level is checked  Current status: Pt educated in and handouts provided on mandalynth tracing therapy and mindfulness.  Pt practiced the tracing therapy and reports liking it for calming and coping.    Barriers to return to prior living situation: impaired coping skills  Recommendations for discharge: recommend pt return home with outpatient services and therapy  Rationale for recommendations: per eval.         Entered by: Gali Mata 04/09/2020 1:57 PM

## 2020-04-09 NOTE — PLAN OF CARE
Face to face report received from Gali STARKEY RN. Pt. Observed.     Problem: Adult Behavioral Health Plan of Care  Goal: Patient-Specific Goal (Individualization)  Description: Pt will sleep 6-8 hours per night.   Pt will eat 50% of meals and snacks.   Pt will attend 50% of groups.   Outcome: No Change  Note:        Pt has been in bed with eyes closed and regular respirations x 7 hours this noc shift. 15 minute and PRN checks all night. No complaints offered. Will continue to monitor.      Face to face end of shift report to be communicated to oncoming RN.     Ting Wilder RN  4/9/2020

## 2020-04-09 NOTE — PLAN OF CARE
"Problem: Adult Behavioral Health Plan of Care  Goal: Patient-Specific Goal (Individualization)  Description: Pt will sleep 6-8 hours per night.   Pt will eat 50% of meals and snacks.   Pt will attend 50% of groups.   4/9/2020 1803 by Hansa Hernández, RN  Outcome: Improving  Note: Pt is looking forward to discharging tomorrow morning at 1000. Pt discussed how he had \"burnout\" from being overworked at [employer], which resulted in difficulty processing his emotions. Pt verbalized that this inpatient stay has allowed him to \"re prioritize\" what is important to him. Pt participated in group programming and milieu activities.    Pt endorsed anxiety - he used Alpha Stim and was given 50 mg of PRN Hydroxyzine at 1949 - good effect reported.     Face to face end of shift report communicated to oncoming RN.     Problem: Suicide Risk  Goal: Absence of Self-Harm  Description: Pt will be free from self injury while on the unit.   4/9/2020 1803 by Hansa Hernández, RN  Outcome: Improving  Note: Pt denied suicidal ideation. He remained free from self harm.      "

## 2020-04-10 VITALS
BODY MASS INDEX: 23.86 KG/M2 | HEART RATE: 58 BPM | SYSTOLIC BLOOD PRESSURE: 114 MMHG | RESPIRATION RATE: 16 BRPM | WEIGHT: 166.3 LBS | OXYGEN SATURATION: 97 % | TEMPERATURE: 97 F | DIASTOLIC BLOOD PRESSURE: 75 MMHG

## 2020-04-10 PROCEDURE — 99239 HOSP IP/OBS DSCHRG MGMT >30: CPT | Mod: 95 | Performed by: NURSE PRACTITIONER

## 2020-04-10 PROCEDURE — 25000132 ZZH RX MED GY IP 250 OP 250 PS 637: Performed by: NURSE PRACTITIONER

## 2020-04-10 RX ADMIN — LITHIUM CARBONATE 300 MG: 300 TABLET, EXTENDED RELEASE ORAL at 08:18

## 2020-04-10 RX ADMIN — MELATONIN 50 MCG: at 08:18

## 2020-04-10 NOTE — PLAN OF CARE
Discharge Note    Patient Discharged to home on 4/10/2020 1:15 PM via [employer] accompanied by staff to the door.     Patient informed of discharge instructions in AVS. patient verbalizes understanding and denies having any questions pertaining to AVS. Patient stable at time of discharge. Patient denies SI, HI, and thoughts of self harm at time of discharge. All personal belongings returned to patient. Discharge prescriptions were not sent to a pharmacy because patient was only taking Vitamin D and he will pick it up at any store.  Psych evaluation, history and physical, AVS, and discharge summary faxed to next level of care by

## 2020-04-10 NOTE — PLAN OF CARE
Face to face report received from Hansa CULLEN. Pt. Observed.       Problem: Adult Behavioral Health Plan of Care  Goal: Patient-Specific Goal (Individualization)  Description: Pt will sleep 6-8 hours per night.   Pt will eat 50% of meals and snacks.   Pt will attend 50% of groups.   4/10/2020 0412 by Ting Wilder RN  Outcome: Improving  Note:        Pt has been in bed with eyes closed and regular respirations x 7 hours this noc shift. 15 minute and PRN checks all night. No complaints offered. Will continue to monitor.      Face to face end of shift report to be communicated to oncoming RN.     Ting Wilder RN  4/10/2020

## 2020-04-10 NOTE — DISCHARGE SUMMARY
"Psychiatric Discharge Summary    Jason Ring MRN# [id number]   Age: 39 year old YOB: 1980     Date of Admission:  4/1/2020  Date of Discharge:  4/10/2020  Admitting Physician:  Scott Sequeira MD  Discharge Physician:  Skylar Garcia CNP         Event Leading to Hospitalization and Hospital Stay   Admission: Jason Ring is a 38 year old 39 year old male with a medical history significant for suicidal ideation and alcohol abuse who presents to the Emergency Department for suicidal ideation.  Stressors include increased workload at [employer] of 70 hours six days a wee, three weeks of worsening feelings of hopelessness and thoughts of suicide.  Lives with girlfriend of 10 years and caring for two small children at home.  He states he has only been sleeping about three hours a night.  He stayed in a hotel last night to try and \"escape\" his thoughts but continues to have thoughts of jumping off a bridge. Currently taking Zoloft and clonazepam.  Reports one previous hospitalization when drunk and suicidal in [address]014.  Toxicology was positive for cannabis. Patient was transferred to [facility] and voluntarily admitted to Inpatient Behavioral Health for further assessment and stabilization.     During initial interview with  present, patient stated he has struggled with depression since his 20's and tried fluoxetine and Zoloft, which he feels is not working.  Reviewed antidepressants and agreeable to start SNRI Effexor titrated up to 150 mg.  Educated regarding medication indications, risks, benefits, side effects, contraindications and possible interactions. Verbally expressed understanding.Had been seeing a therapist but did not care for the lack of direction \"nice mp, just listens\". Reviewed other therapies such as CBT which patient may find beneficial.  Stated lack of sleep, increased workload and lack of resources \"like a [place] rush but not staffed for this in March\" with " "[employer]. Had been sleeping in two periods of 3-4 hours each but now only sleeping about three hours. Stated increased anxiety. Was having thoughts of suicide with plan but denied today. Stated decreased appetite with 10 pound weight loss, clothes are reported to be looser.  Denied paranoid thoughts or auditory and visual hallucinations.  Stated this is \"not like the other hospitalization\" which he noted he was drunk and suicidal.  Stated he does use mariajuana once in a while but not finding the results therapeutic at this time.        Hospital stay: Jason was admitted to the behavioral health unit as a voluntary patient. Zoloft was stopped and Effexor XR was started. Within a few days he reported feeling increasingly agitated on the Effexor, so this was stopped. He was started on Lithium twice a day for mood stability. After three days he reported that he was beginning to experience side effects with this, reporting mild tinnitus, flushing, and dry mouth. He was then tapered off of this. Over the course of his stay he reported feeling less depressed and anxious overall. He denied any further suicidal thoughts. He was able to speak to his girlfriend and felt that they were able to work out any issues they had. He attended group programming and was social with staff and peers. He met with a therapist on the unit and was able to work with OT on coping skills and make a sensory kit. He reports feeling ready to discharge home, no further residual side effects from medication. He will discharge back home today. He denies any further suicidal ideation. He will follow-up with unit therapist by phone on Monday, and is scheduled to start seeing a new outpatient therapist next Thursday. He will discharge home today to follow-up with outpatient mental health services.         At time of discharge, there is no evidence that patient is in immediate danger of self or others.        DIagnoses:   Major Depressive Disorder, Recurrent, " moderate  Anxiety Disorder, unspecified         Labs:     Admission labwork notable for urine drug screen positive for THC.           Discharge Medications:     Current Discharge Medication List      START taking these medications    Details   hydrocortisone (CORTAID) 1 % external cream Apply topically 2 times daily as needed for itching  Qty:        selenium sulfide (SELSUN) 1 % LOTN lotion Apply topically daily as needed for irritation or itching  Qty:        Vitamin D3 (CHOLECALCIFEROL) 2000 units (50 mcg) tablet Take 1 tablet (50 mcg) by mouth daily  Qty:      Associated Diagnoses: Vitamin D deficiency         STOP taking these medications       clonazePAM (KLONOPIN) 0.5 MG tablet Comments:   Reason for Stopping:         sertraline (ZOLOFT) 100 MG tablet Comments:   Reason for Stopping:                 Justification for dual anti-psychotic use: not applicable       Psychiatric Examination:   Appearance:  awake, alert, adequately groomed and appeared as age stated  Attitude:  cooperative, pleasant  Eye Contact:  good  Mood:  better  Affect:  appropriate and in normal range  Speech:  clear, coherent  Psychomotor Behavior:  no evidence of tardive dyskinesia, dystonia, or tics  Thought Process:  logical, linear and goal oriented  Associations:  no loose associations  Thought Content:  no evidence of suicidal ideation or homicidal ideation and no evidence of psychotic thought  Insight:  good  Judgment:  intact  Oriented to:  time, person, and place  Attention Span and Concentration:  intact  Recent and Remote Memory:  intact  Fund of Knowledge: appropriate  Muscle Strength and Tone: normal  Gait and Station: Normal  Perception: no perceptual disturbances noted       Discharge Plan:   Psychiatry Follow-up:     [facility] crisis line - [phone]    [facility]   Therapy- Anurag Brewer   Phone: [phone]     [facility]   - Whit- As needed   Phone: [phone] [facility] [address]      [place]  "[facility]  PCP- Dr. Oconnor - April 21st @ 11:30am   [address]  [place] [address]   Phone: [phone]  Fax: [phone]    [facility]   CBT Therapist- Desiree Haider- April 16th @ 1pm- check your email as intake forms will be coming to have completed prior to apt.   [address]  [place] [address]   Phone: [phone]  Fax: [phone]       Previous Providers:     Psychiatric Services  Medication Management- Deana Flores  [address]  [address]  [place] [address]   Phone: [phone]      Therapist- Brandon Prieto   [address]  [place] [address]   Phone: [phone]      Attestation:  The patient has been seen and evaluated by me,  Skylar Garcia NP         Discharge Services Provided:    35 minutes spent on discharge services, including:  Final examination of patient.  Review and discussion of Hospital stay.  Instructions for continued outpatient care/goals.  Preparation of discharge records.  Preparation of medications refills and new prescriptions.        Jason Ring is a 39 year old male who is being evaluated via a billable video visit.      The patient has been notified of following:     \"This video visit will be conducted via a call between you and your physician/provider. We have found that certain health care needs can be provided without the need for an in-person physical exam.  This service lets us provide the care you need with a video conversation.  If a prescription is necessary we can send it directly to your pharmacy.  If lab work is needed we can place an order for that and you can then stop by our lab to have the test done at a later time.    Video visits are billed at different rates depending on your insurance coverage.  Please reach out to your insurance provider with any questions.    If during the course of the call the physician/provider feels a video visit is not appropriate, you will " "not be charged for this service.\"    Patient has given verbal consent for Video visit? Yes    Video Start Time: 0900        Video-Visit Details    Type of service:  Video Visit    Video End Time (time video stopped): 0920    Originating Location (pt. Location): Other  [facility]    Distant Location (provider location):  [facility]     Mode of Communication:  Video Conference via Collette Garcia CNP      "

## 2020-04-13 NOTE — PLAN OF CARE
Occupational Therapy Discharge Summary    Reason for therapy discharge:    Discharged to home.  All goals and outcomes met, no further needs identified.    Progress towards therapy goal(s). See goals on Care Plan in [facility] electronic health record for goal details.  Goals met    Therapy recommendation(s):    No further therapy is recommended.